# Patient Record
Sex: FEMALE | Race: OTHER | NOT HISPANIC OR LATINO | Employment: UNEMPLOYED | URBAN - METROPOLITAN AREA
[De-identification: names, ages, dates, MRNs, and addresses within clinical notes are randomized per-mention and may not be internally consistent; named-entity substitution may affect disease eponyms.]

---

## 2023-03-03 LAB
ALANINE AMINOTRANSFERASE (SGPT) (U/L) IN SER/PLAS: 17 U/L (ref 7–45)
ALBUMIN (G/DL) IN SER/PLAS: 4.3 G/DL (ref 3.4–5)
ALKALINE PHOSPHATASE (U/L) IN SER/PLAS: 57 U/L (ref 33–110)
ANION GAP IN SER/PLAS: 12 MMOL/L (ref 10–20)
ANTI-DNA (DS): <1 IU/ML
ASPARTATE AMINOTRANSFERASE (SGOT) (U/L) IN SER/PLAS: 19 U/L (ref 9–39)
BASOPHILS (10*3/UL) IN BLOOD BY AUTOMATED COUNT: 0.02 X10E9/L (ref 0–0.1)
BASOPHILS/100 LEUKOCYTES IN BLOOD BY AUTOMATED COUNT: 0.2 % (ref 0–2)
BILIRUBIN TOTAL (MG/DL) IN SER/PLAS: 0.5 MG/DL (ref 0–1.2)
CALCIUM (MG/DL) IN SER/PLAS: 8.9 MG/DL (ref 8.6–10.3)
CARBON DIOXIDE, TOTAL (MMOL/L) IN SER/PLAS: 27 MMOL/L (ref 21–32)
CHLORIDE (MMOL/L) IN SER/PLAS: 101 MMOL/L (ref 98–107)
COBALAMIN (VITAMIN B12) (PG/ML) IN SER/PLAS: 325 PG/ML (ref 211–911)
COMPLEMENT C3 (MG/DL) IN SER/PLAS: 138 MG/DL (ref 87–200)
COMPLEMENT C4 (MG/DL) IN SER/PLAS: 54 MG/DL (ref 10–50)
CREATININE (MG/DL) IN SER/PLAS: 0.57 MG/DL (ref 0.5–1.05)
CREATININE (MG/DL) IN URINE: 18.3 MG/DL (ref 20–320)
EOSINOPHILS (10*3/UL) IN BLOOD BY AUTOMATED COUNT: 0.14 X10E9/L (ref 0–0.7)
EOSINOPHILS/100 LEUKOCYTES IN BLOOD BY AUTOMATED COUNT: 1.7 % (ref 0–6)
ERYTHROCYTE DISTRIBUTION WIDTH (RATIO) BY AUTOMATED COUNT: 13.6 % (ref 11.5–14.5)
ERYTHROCYTE MEAN CORPUSCULAR HEMOGLOBIN CONCENTRATION (G/DL) BY AUTOMATED: 31.6 G/DL (ref 32–36)
ERYTHROCYTE MEAN CORPUSCULAR VOLUME (FL) BY AUTOMATED COUNT: 79 FL (ref 80–100)
ERYTHROCYTES (10*6/UL) IN BLOOD BY AUTOMATED COUNT: 5.37 X10E12/L (ref 4–5.2)
FOLATE (NG/ML) IN SER/PLAS: 18.2 NG/ML
GFR FEMALE: >90 ML/MIN/1.73M2
GLUCOSE (MG/DL) IN SER/PLAS: 79 MG/DL (ref 74–99)
HEMATOCRIT (%) IN BLOOD BY AUTOMATED COUNT: 42.4 % (ref 36–46)
HEMOGLOBIN (G/DL) IN BLOOD: 13.4 G/DL (ref 12–16)
IMMATURE GRANULOCYTES/100 LEUKOCYTES IN BLOOD BY AUTOMATED COUNT: 0.2 % (ref 0–0.9)
LEUKOCYTES (10*3/UL) IN BLOOD BY AUTOMATED COUNT: 8 X10E9/L (ref 4.4–11.3)
LYMPHOCYTES (10*3/UL) IN BLOOD BY AUTOMATED COUNT: 3.37 X10E9/L (ref 1.2–4.8)
LYMPHOCYTES/100 LEUKOCYTES IN BLOOD BY AUTOMATED COUNT: 41.9 % (ref 13–44)
MONOCYTES (10*3/UL) IN BLOOD BY AUTOMATED COUNT: 0.99 X10E9/L (ref 0.1–1)
MONOCYTES/100 LEUKOCYTES IN BLOOD BY AUTOMATED COUNT: 12.3 % (ref 2–10)
NEUTROPHILS (10*3/UL) IN BLOOD BY AUTOMATED COUNT: 3.5 X10E9/L (ref 1.2–7.7)
NEUTROPHILS/100 LEUKOCYTES IN BLOOD BY AUTOMATED COUNT: 43.7 % (ref 40–80)
PLATELETS (10*3/UL) IN BLOOD AUTOMATED COUNT: 285 X10E9/L (ref 150–450)
POTASSIUM (MMOL/L) IN SER/PLAS: 3.8 MMOL/L (ref 3.5–5.3)
PROTEIN (MG/DL) IN URINE: 4 MG/DL (ref 5–24)
PROTEIN TOTAL: 7.1 G/DL (ref 6.4–8.2)
PROTEIN/CREATININE (MG/MG) IN URINE: 0.22 MG/MG CREAT (ref 0–0.17)
SEDIMENTATION RATE, ERYTHROCYTE: 7 MM/H (ref 0–20)
SODIUM (MMOL/L) IN SER/PLAS: 136 MMOL/L (ref 136–145)
UREA NITROGEN (MG/DL) IN SER/PLAS: 10 MG/DL (ref 6–23)

## 2023-04-17 ENCOUNTER — OFFICE VISIT (OUTPATIENT)
Dept: PRIMARY CARE | Facility: CLINIC | Age: 28
End: 2023-04-17
Payer: COMMERCIAL

## 2023-04-17 ENCOUNTER — TELEPHONE (OUTPATIENT)
Dept: PRIMARY CARE | Facility: CLINIC | Age: 28
End: 2023-04-17

## 2023-04-17 VITALS
WEIGHT: 170.38 LBS | HEART RATE: 75 BPM | OXYGEN SATURATION: 96 % | SYSTOLIC BLOOD PRESSURE: 113 MMHG | DIASTOLIC BLOOD PRESSURE: 79 MMHG | RESPIRATION RATE: 16 BRPM | BODY MASS INDEX: 33.27 KG/M2 | TEMPERATURE: 98 F

## 2023-04-17 DIAGNOSIS — Z00.00 HEALTHCARE MAINTENANCE: ICD-10-CM

## 2023-04-17 DIAGNOSIS — F32.1 CURRENT MODERATE EPISODE OF MAJOR DEPRESSIVE DISORDER, UNSPECIFIED WHETHER RECURRENT (MULTI): Primary | ICD-10-CM

## 2023-04-17 DIAGNOSIS — M32.9 SYSTEMIC LUPUS ERYTHEMATOSUS, UNSPECIFIED SLE TYPE, UNSPECIFIED ORGAN INVOLVEMENT STATUS (MULTI): ICD-10-CM

## 2023-04-17 PROBLEM — M79.7 FIBROMYALGIA: Status: ACTIVE | Noted: 2023-04-17

## 2023-04-17 PROBLEM — M25.50 ARTHRALGIA OF MULTIPLE SITES: Status: ACTIVE | Noted: 2023-04-17

## 2023-04-17 PROBLEM — F32.A DEPRESSION: Status: ACTIVE | Noted: 2023-04-17

## 2023-04-17 PROBLEM — L23.81: Status: ACTIVE | Noted: 2023-04-17

## 2023-04-17 PROBLEM — H50.15 ALTERNATING EXOTROPIA: Status: ACTIVE | Noted: 2023-04-17

## 2023-04-17 PROCEDURE — 1036F TOBACCO NON-USER: CPT | Performed by: STUDENT IN AN ORGANIZED HEALTH CARE EDUCATION/TRAINING PROGRAM

## 2023-04-17 PROCEDURE — 99395 PREV VISIT EST AGE 18-39: CPT | Performed by: STUDENT IN AN ORGANIZED HEALTH CARE EDUCATION/TRAINING PROGRAM

## 2023-04-17 RX ORDER — BUPROPION HYDROCHLORIDE 150 MG/1
150 TABLET ORAL DAILY
COMMUNITY
Start: 2022-08-01 | End: 2023-04-17 | Stop reason: SDUPTHER

## 2023-04-17 RX ORDER — BUPROPION HYDROCHLORIDE 150 MG/1
150 TABLET ORAL DAILY
Qty: 90 TABLET | Refills: 3 | Status: SHIPPED | OUTPATIENT
Start: 2023-04-17 | End: 2023-10-23 | Stop reason: SDUPTHER

## 2023-04-17 RX ORDER — BELIMUMAB 200 MG/ML
200 SOLUTION SUBCUTANEOUS
COMMUNITY
Start: 2018-07-17 | End: 2023-10-25 | Stop reason: SDUPTHER

## 2023-04-17 RX ORDER — ALBUTEROL SULFATE 90 UG/1
2 AEROSOL, METERED RESPIRATORY (INHALATION) EVERY 4 HOURS PRN
COMMUNITY
Start: 2022-07-15 | End: 2023-10-23 | Stop reason: SDUPTHER

## 2023-04-17 RX ORDER — VIT C/E/ZN/COPPR/LUTEIN/ZEAXAN 250MG-90MG
25 CAPSULE ORAL DAILY
COMMUNITY

## 2023-04-17 SDOH — ECONOMIC STABILITY: FOOD INSECURITY: WITHIN THE PAST 12 MONTHS, THE FOOD YOU BOUGHT JUST DIDN'T LAST AND YOU DIDN'T HAVE MONEY TO GET MORE.: NEVER TRUE

## 2023-04-17 SDOH — ECONOMIC STABILITY: FOOD INSECURITY: WITHIN THE PAST 12 MONTHS, YOU WORRIED THAT YOUR FOOD WOULD RUN OUT BEFORE YOU GOT MONEY TO BUY MORE.: NEVER TRUE

## 2023-04-17 SDOH — ECONOMIC STABILITY: HOUSING INSECURITY
IN THE LAST 12 MONTHS, WAS THERE A TIME WHEN YOU DID NOT HAVE A STEADY PLACE TO SLEEP OR SLEPT IN A SHELTER (INCLUDING NOW)?: YES

## 2023-04-17 SDOH — HEALTH STABILITY: PHYSICAL HEALTH: ON AVERAGE, HOW MANY MINUTES DO YOU ENGAGE IN EXERCISE AT THIS LEVEL?: 120 MIN

## 2023-04-17 SDOH — ECONOMIC STABILITY: TRANSPORTATION INSECURITY
IN THE PAST 12 MONTHS, HAS LACK OF TRANSPORTATION KEPT YOU FROM MEETINGS, WORK, OR FROM GETTING THINGS NEEDED FOR DAILY LIVING?: NO

## 2023-04-17 SDOH — ECONOMIC STABILITY: TRANSPORTATION INSECURITY
IN THE PAST 12 MONTHS, HAS THE LACK OF TRANSPORTATION KEPT YOU FROM MEDICAL APPOINTMENTS OR FROM GETTING MEDICATIONS?: NO

## 2023-04-17 SDOH — ECONOMIC STABILITY: INCOME INSECURITY: IN THE LAST 12 MONTHS, WAS THERE A TIME WHEN YOU WERE NOT ABLE TO PAY THE MORTGAGE OR RENT ON TIME?: YES

## 2023-04-17 SDOH — HEALTH STABILITY: PHYSICAL HEALTH: ON AVERAGE, HOW MANY DAYS PER WEEK DO YOU ENGAGE IN MODERATE TO STRENUOUS EXERCISE (LIKE A BRISK WALK)?: 6 DAYS

## 2023-04-17 ASSESSMENT — ENCOUNTER SYMPTOMS
NAUSEA: 0
PALPITATIONS: 0
VOMITING: 0
LIGHT-HEADEDNESS: 0
SHORTNESS OF BREATH: 0
LOSS OF SENSATION IN FEET: 0
CONSTIPATION: 0
FEVER: 0
FLANK PAIN: 0
FATIGUE: 0
BLOOD IN STOOL: 0
DEPRESSION: 0
ARTHRALGIAS: 0
ABDOMINAL PAIN: 0
RHINORRHEA: 0
HEMATURIA: 0
MYALGIAS: 0
DIZZINESS: 0
DIARRHEA: 0
SINUS PAIN: 0
APPETITE CHANGE: 0
OCCASIONAL FEELINGS OF UNSTEADINESS: 0

## 2023-04-17 ASSESSMENT — PATIENT HEALTH QUESTIONNAIRE - PHQ9
6. FEELING BAD ABOUT YOURSELF - OR THAT YOU ARE A FAILURE OR HAVE LET YOURSELF OR YOUR FAMILY DOWN: SEVERAL DAYS
SUM OF ALL RESPONSES TO PHQ QUESTIONS 1-9: 7
5. POOR APPETITE OR OVEREATING: NOT AT ALL
SUM OF ALL RESPONSES TO PHQ9 QUESTIONS 1 & 2: 2
4. FEELING TIRED OR HAVING LITTLE ENERGY: SEVERAL DAYS
2. FEELING DOWN, DEPRESSED OR HOPELESS: SEVERAL DAYS
8. MOVING OR SPEAKING SO SLOWLY THAT OTHER PEOPLE COULD HAVE NOTICED. OR THE OPPOSITE, BEING SO FIGETY OR RESTLESS THAT YOU HAVE BEEN MOVING AROUND A LOT MORE THAN USUAL: SEVERAL DAYS
7. TROUBLE CONCENTRATING ON THINGS, SUCH AS READING THE NEWSPAPER OR WATCHING TELEVISION: NOT AT ALL
9. THOUGHTS THAT YOU WOULD BE BETTER OFF DEAD, OR OF HURTING YOURSELF: SEVERAL DAYS
3. TROUBLE FALLING OR STAYING ASLEEP: SEVERAL DAYS
10. IF YOU CHECKED OFF ANY PROBLEMS, HOW DIFFICULT HAVE THESE PROBLEMS MADE IT FOR YOU TO DO YOUR WORK, TAKE CARE OF THINGS AT HOME, OR GET ALONG WITH OTHER PEOPLE: SOMEWHAT DIFFICULT
1. LITTLE INTEREST OR PLEASURE IN DOING THINGS: SEVERAL DAYS

## 2023-04-17 ASSESSMENT — LIFESTYLE VARIABLES
HOW OFTEN DO YOU HAVE SIX OR MORE DRINKS ON ONE OCCASION: NEVER
HOW OFTEN DO YOU HAVE A DRINK CONTAINING ALCOHOL: NEVER
HOW MANY STANDARD DRINKS CONTAINING ALCOHOL DO YOU HAVE ON A TYPICAL DAY: PATIENT DOES NOT DRINK
AUDIT-C TOTAL SCORE: 0
SKIP TO QUESTIONS 9-10: 1

## 2023-04-17 ASSESSMENT — SOCIAL DETERMINANTS OF HEALTH (SDOH)
DO YOU BELONG TO ANY CLUBS OR ORGANIZATIONS SUCH AS CHURCH GROUPS UNIONS, FRATERNAL OR ATHLETIC GROUPS, OR SCHOOL GROUPS?: YES
HOW OFTEN DO YOU ATTENT MEETINGS OF THE CLUB OR ORGANIZATION YOU BELONG TO?: MORE THAN 4 TIMES PER YEAR
HOW OFTEN DO YOU GET TOGETHER WITH FRIENDS OR RELATIVES?: ONCE A WEEK
WITHIN THE LAST YEAR, HAVE TO BEEN RAPED OR FORCED TO HAVE ANY KIND OF SEXUAL ACTIVITY BY YOUR PARTNER OR EX-PARTNER?: NO
WITHIN THE LAST YEAR, HAVE YOU BEEN KICKED, HIT, SLAPPED, OR OTHERWISE PHYSICALLY HURT BY YOUR PARTNER OR EX-PARTNER?: NO
HOW HARD IS IT FOR YOU TO PAY FOR THE VERY BASICS LIKE FOOD, HOUSING, MEDICAL CARE, AND HEATING?: VERY HARD
WITHIN THE LAST YEAR, HAVE YOU BEEN AFRAID OF YOUR PARTNER OR EX-PARTNER?: NO
IN A TYPICAL WEEK, HOW MANY TIMES DO YOU TALK ON THE PHONE WITH FAMILY, FRIENDS, OR NEIGHBORS?: THREE TIMES A WEEK
HOW OFTEN DO YOU ATTEND CHURCH OR RELIGIOUS SERVICES?: MORE THAN 4 TIMES PER YEAR
WITHIN THE LAST YEAR, HAVE YOU BEEN HUMILIATED OR EMOTIONALLY ABUSED IN OTHER WAYS BY YOUR PARTNER OR EX-PARTNER?: NO

## 2023-04-17 NOTE — PROGRESS NOTES
Subjective   Leah Scanlon is a 28 y.o. female who is here for a routine exam.  Active Problem List      Comprehensive Medical/Surgical/Social/Family History  Past Medical History:   Diagnosis Date    Anesthesia of skin 10/16/2017    Numbness in feet    Encounter for contraceptive management, unspecified 09/10/2020    Contraception management    Encounter for gynecological examination (general) (routine) without abnormal findings 12/17/2021    Well woman exam    Gastro-esophageal reflux disease without esophagitis 02/04/2022    GERD without esophagitis    Migraine, unspecified, not intractable, without status migrainosus 10/16/2017    Migraine    Other specified cough 08/12/2019    Productive cough    Other specified symptoms and signs involving the digestive system and abdomen 06/10/2020    Irregular bowel habits    Personal history of diseases of the skin and subcutaneous tissue 09/10/2020    History of acne    Personal history of other diseases of the digestive system 06/10/2020    History of rectal bleeding    Personal history of other diseases of the digestive system 06/10/2020    History of gastroesophageal reflux (GERD)    Personal history of other diseases of the digestive system 04/27/2018    History of dental abscess    Personal history of other diseases of the nervous system and sense organs     History of itching of eye    Personal history of other mental and behavioral disorders 10/16/2017    History of depression    Personal history of other specified conditions 08/01/2022    History of urinary incontinence    Systemic involvement of connective tissue, unspecified (CMS/HCC) 06/06/2018    Undifferentiated connective tissue disease     Past Surgical History:   Procedure Laterality Date    EYE SURGERY  10/16/2017    Eye Surgery    FOOT SURGERY  10/16/2017    Foot Surgery    TONSILLECTOMY  10/16/2017    Tonsillectomy With Adenoidectomy     Social History     Social History Narrative    Not on file          Allergies and Medications  Dog dander, Bee pollen, Cat dander, Other, and Ragweed  Current Outpatient Medications on File Prior to Visit   Medication Sig Dispense Refill    albuterol 90 mcg/actuation inhaler Inhale 2 puffs every 4 hours if needed.      Benlysta 200 mg/mL injection Inject 1 mL (200 mg) under the skin 1 (one) time per week.      CALCIUM ORAL Take 50 mg by mouth once daily.      cholecalciferol (Vitamin D-3) 25 MCG (1000 UT) capsule Take 1 capsule (25 mcg) by mouth once daily.      MAGNESIUM CARBONATE ORAL Take by mouth once daily.      multivitamin capsule Take 1 capsule by mouth once daily.      RIBOFLAVIN, VITAMIN B2, ORAL Take by mouth once daily.      [DISCONTINUED] buPROPion XL (Wellbutrin XL) 150 mg 24 hr tablet Take 1 tablet (150 mg) by mouth once daily.       No current facility-administered medications on file prior to visit.     Improving knee pain with informal PT    Had an episode of SOB with exertion chasing a dog while watching.    Stopped using cane, improving pain.    Restarted benlysta, significant improvement with this.          Lifestyle  Diet: Improving diet, once monthly eating out  Exercise: PT, improving exercises  Tobacco: None  Alcohol:  None  Stress/Work:  , doing well with this.  THC gummies with SLE flare ups    Pap Smear: 4 years ago  Periods are irregular, lasting 5 days.   Dysmenorrhea:none.   Cyclic symptoms include none.   No intermenstrual bleeding, spotting, or discharge.    Current contraception: none  History of abnormal Pap smear: no  Family history of breast cancer: no    Menstrual History:  OB History    No obstetric history on file.        No LMP recorded.     The patient is due for a Pap smear, and to see OB/GYN is requesting new OB/GYN referral.  No other concerns.  Improving overall health as above.    Review of Systems   Constitutional:  Negative for appetite change, fatigue and fever.   HENT:  Negative for ear discharge, ear pain,  hearing loss, postnasal drip, rhinorrhea and sinus pain.    Eyes:  Negative for visual disturbance.   Respiratory:  Negative for shortness of breath.    Cardiovascular:  Negative for chest pain, palpitations and leg swelling.   Gastrointestinal:  Negative for abdominal pain, blood in stool, constipation, diarrhea, nausea and vomiting.   Genitourinary:  Negative for flank pain and hematuria.   Musculoskeletal:  Negative for arthralgias and myalgias.   Skin:  Negative for rash.   Neurological:  Negative for dizziness and light-headedness.   All other systems reviewed and are negative.      Objective   /79 (BP Location: Right arm, Patient Position: Sitting)   Pulse 75   Temp 36.7 °C (98 °F) (Temporal)   Resp 16   Wt 77.3 kg (170 lb 6 oz)   SpO2 96%   BMI 33.27 kg/m²     Physical Exam  Vitals reviewed.   Constitutional:       General: She is not in acute distress.     Appearance: Normal appearance. She is normal weight. She is not toxic-appearing.   HENT:      Head: Normocephalic and atraumatic.      Right Ear: Tympanic membrane and ear canal normal.      Left Ear: Tympanic membrane and ear canal normal.      Nose: Nose normal. No congestion or rhinorrhea.      Mouth/Throat:      Mouth: Mucous membranes are dry.   Eyes:      General: No scleral icterus.     Extraocular Movements: Extraocular movements intact.      Conjunctiva/sclera: Conjunctivae normal.      Pupils: Pupils are equal, round, and reactive to light.   Cardiovascular:      Rate and Rhythm: Normal rate and regular rhythm.      Heart sounds: No murmur heard.     No friction rub. No gallop.   Pulmonary:      Effort: Pulmonary effort is normal. No respiratory distress.      Breath sounds: Normal breath sounds. No wheezing, rhonchi or rales.   Abdominal:      General: Abdomen is flat. There is no distension.      Palpations: Abdomen is soft.      Tenderness: There is no abdominal tenderness. There is no guarding.   Musculoskeletal:         General:  Normal range of motion.      Cervical back: Normal range of motion and neck supple.      Right lower leg: No edema.      Left lower leg: No edema.   Lymphadenopathy:      Cervical: No cervical adenopathy.   Skin:     General: Skin is warm and dry.   Neurological:      General: No focal deficit present.      Mental Status: She is alert and oriented to person, place, and time.   Psychiatric:         Mood and Affect: Mood normal.         Behavior: Behavior normal.         Assessment/Plan   Problem List Items Addressed This Visit          Other    Depression - Primary    Relevant Medications    buPROPion XL (Wellbutrin XL) 150 mg 24 hr tablet     Other Visit Diagnoses       Healthcare maintenance                Reviewed Social Determinants of health with patient, discussed healthy lifestyle including 150 minutes of physical activity per week  Ordered/Reviewed baseline labwork -CBC, CMP, Lipid Panel  Reviewed from rheumatology largely unremarkable  Immunizations Up-to-Date  Pap smear due, will follow with OB/GYN  Mammogram not indicated    We will refill her Wellbutrin overall doing well.

## 2023-04-17 NOTE — TELEPHONE ENCOUNTER
Pt forgot to ask in her appointment this morning for a medication to be sent into the pharmacy. She said it is Diflucan, what she usually gets prescribed for a yeast infection. She said she is getting  soon.     [facility], [place], [place] and [place]

## 2023-10-16 ENCOUNTER — APPOINTMENT (OUTPATIENT)
Dept: PRIMARY CARE | Facility: CLINIC | Age: 28
End: 2023-10-16
Payer: COMMERCIAL

## 2023-10-23 ENCOUNTER — OFFICE VISIT (OUTPATIENT)
Dept: PRIMARY CARE | Facility: CLINIC | Age: 28
End: 2023-10-23
Payer: COMMERCIAL

## 2023-10-23 VITALS
HEART RATE: 92 BPM | BODY MASS INDEX: 33.23 KG/M2 | OXYGEN SATURATION: 98 % | TEMPERATURE: 98.2 F | SYSTOLIC BLOOD PRESSURE: 128 MMHG | WEIGHT: 170.13 LBS | RESPIRATION RATE: 16 BRPM | DIASTOLIC BLOOD PRESSURE: 86 MMHG

## 2023-10-23 DIAGNOSIS — R00.0 TACHYCARDIA: ICD-10-CM

## 2023-10-23 DIAGNOSIS — R32 URINARY INCONTINENCE, UNSPECIFIED TYPE: ICD-10-CM

## 2023-10-23 DIAGNOSIS — M32.9 SYSTEMIC LUPUS ERYTHEMATOSUS, UNSPECIFIED SLE TYPE, UNSPECIFIED ORGAN INVOLVEMENT STATUS (MULTI): ICD-10-CM

## 2023-10-23 DIAGNOSIS — F90.9 ATTENTION DEFICIT HYPERACTIVITY DISORDER (ADHD), UNSPECIFIED ADHD TYPE: Primary | ICD-10-CM

## 2023-10-23 DIAGNOSIS — F32.1 CURRENT MODERATE EPISODE OF MAJOR DEPRESSIVE DISORDER, UNSPECIFIED WHETHER RECURRENT (MULTI): ICD-10-CM

## 2023-10-23 PROCEDURE — 99214 OFFICE O/P EST MOD 30 MIN: CPT | Performed by: STUDENT IN AN ORGANIZED HEALTH CARE EDUCATION/TRAINING PROGRAM

## 2023-10-23 PROCEDURE — 1036F TOBACCO NON-USER: CPT | Performed by: STUDENT IN AN ORGANIZED HEALTH CARE EDUCATION/TRAINING PROGRAM

## 2023-10-23 RX ORDER — ALBUTEROL SULFATE 90 UG/1
2 AEROSOL, METERED RESPIRATORY (INHALATION) EVERY 4 HOURS PRN
Qty: 18 G | Refills: 11 | Status: SHIPPED | OUTPATIENT
Start: 2023-10-23 | End: 2024-04-07 | Stop reason: SDUPTHER

## 2023-10-23 RX ORDER — BUPROPION HYDROCHLORIDE 150 MG/1
150 TABLET ORAL DAILY
Qty: 90 TABLET | Refills: 3 | Status: SHIPPED | OUTPATIENT
Start: 2023-10-23 | End: 2023-11-27 | Stop reason: SDUPTHER

## 2023-10-23 ASSESSMENT — ENCOUNTER SYMPTOMS
VOMITING: 0
FEVER: 0
DIZZINESS: 0
SHORTNESS OF BREATH: 0
NAUSEA: 0
LIGHT-HEADEDNESS: 0

## 2023-10-23 NOTE — PROGRESS NOTES
Subjective   Bea Scanlon is a 28 y.o. female who presents for Med Refill, Referral, and Labs Only (Pt here today to request refills, referral, and orders for lab work).  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  Overall improvement with current visibility and activities of daily living.          Review of Systems   Constitutional:  Negative for fever.   Respiratory:  Negative for shortness of breath.    Cardiovascular:  Negative for chest pain.   Gastrointestinal:  Negative for nausea and vomiting.   Neurological:  Negative for dizziness and light-headedness.   All other systems reviewed and are negative.      Objective   Physical Exam  Vitals reviewed.   Constitutional:       General: She is not in acute distress.     Appearance: Normal appearance. She is not toxic-appearing.   HENT:      Head: Normocephalic and atraumatic.      Nose: Nose normal.   Eyes:      Extraocular Movements: Extraocular movements intact.   Cardiovascular:      Rate and Rhythm: Normal rate and regular rhythm.      Heart sounds: No murmur heard.     No friction rub. No gallop.   Pulmonary:      Effort: Pulmonary effort is normal. No respiratory distress.      Breath sounds: Normal breath sounds. No wheezing, rhonchi or rales.   Skin:     General: Skin is warm and dry.   Neurological:      General: No focal deficit present.      Mental Status: She is alert.   Psychiatric:         Mood and Affect: Mood normal.         Behavior: Behavior normal.         Assessment/Plan   Problem List Items Addressed This Visit       Attention deficit hyperactivity disorder (ADHD) - Primary    Depression    Relevant Medications    buPROPion XL  (Wellbutrin XL) 150 mg 24 hr tablet    SLE (systemic lupus erythematosus) (CMS/[facility])    Relevant Medications    albuterol 90 mcg/actuation inhaler    Other Relevant Orders    Comprehensive Metabolic Panel    CBC     Other Visit Diagnoses       Tachycardia        Relevant Orders    Holter or Event Cardiac Monitor    Magnesium    Referral to Cardiology    Urinary incontinence, unspecified type        Relevant Orders    Referral to Urogynecology        Patient seen today for 6-month follow-up.    Overall doing very well, increasing activities, working.  She has been improving her diet as well.  She has states she has had less binge eating and stress related eating.    Discussed tachycardia could be related to stress, we will get a Holter monitor and refer to cardiology for patient's request.    We will do routine lab work as well to monitor for causes of tachycardia and magnesium.    We will refer to urogynecology for symptoms of incontinence as well.    Follow-up in 6 months or sooner if new concerns arise.

## 2023-10-24 PROBLEM — R35.0 URINARY FREQUENCY: Status: ACTIVE | Noted: 2023-10-24

## 2023-10-24 PROBLEM — G62.9 PERIPHERAL NEUROPATHY: Status: ACTIVE | Noted: 2023-10-24

## 2023-10-24 RX ORDER — FLUORIDE (SODIUM) 1.1 %
PASTE (ML) DENTAL
COMMUNITY
Start: 2023-04-19

## 2023-10-24 RX ORDER — HYDROXYCHLOROQUINE SULFATE 200 MG/1
1 TABLET, FILM COATED ORAL DAILY
COMMUNITY
Start: 2023-07-27 | End: 2023-10-25 | Stop reason: SDUPTHER

## 2023-10-24 RX ORDER — ONDANSETRON 4 MG/1
4 TABLET, ORALLY DISINTEGRATING ORAL EVERY 6 HOURS PRN
COMMUNITY
Start: 2022-11-12 | End: 2023-10-25 | Stop reason: ALTCHOICE

## 2023-10-24 RX ORDER — DOXYCYCLINE 100 MG/1
CAPSULE ORAL
COMMUNITY
Start: 2023-08-17 | End: 2023-10-25 | Stop reason: ALTCHOICE

## 2023-10-24 RX ORDER — FLUCONAZOLE 150 MG/1
TABLET ORAL
COMMUNITY
Start: 2017-01-17 | End: 2023-10-25 | Stop reason: ALTCHOICE

## 2023-10-25 ENCOUNTER — OFFICE VISIT (OUTPATIENT)
Dept: RHEUMATOLOGY | Facility: CLINIC | Age: 28
End: 2023-10-25
Payer: COMMERCIAL

## 2023-10-25 VITALS
DIASTOLIC BLOOD PRESSURE: 70 MMHG | OXYGEN SATURATION: 98 % | SYSTOLIC BLOOD PRESSURE: 122 MMHG | HEART RATE: 91 BPM | HEIGHT: 60 IN | WEIGHT: 166 LBS | TEMPERATURE: 97.6 F | BODY MASS INDEX: 32.59 KG/M2

## 2023-10-25 DIAGNOSIS — G89.29 CHRONIC PAIN OF LEFT KNEE: ICD-10-CM

## 2023-10-25 DIAGNOSIS — M32.9 SYSTEMIC LUPUS ERYTHEMATOSUS, UNSPECIFIED SLE TYPE, UNSPECIFIED ORGAN INVOLVEMENT STATUS (MULTI): Primary | ICD-10-CM

## 2023-10-25 DIAGNOSIS — M25.562 CHRONIC PAIN OF LEFT KNEE: ICD-10-CM

## 2023-10-25 PROCEDURE — 1036F TOBACCO NON-USER: CPT | Performed by: INTERNAL MEDICINE

## 2023-10-25 PROCEDURE — 99214 OFFICE O/P EST MOD 30 MIN: CPT | Performed by: INTERNAL MEDICINE

## 2023-10-25 RX ORDER — BELIMUMAB 200 MG/ML
200 SOLUTION SUBCUTANEOUS
Qty: 12 ML | Refills: 3 | Status: SHIPPED | OUTPATIENT
Start: 2023-10-25 | End: 2023-10-27 | Stop reason: SDUPTHER

## 2023-10-25 RX ORDER — HYDROXYCHLOROQUINE SULFATE 200 MG/1
200 TABLET, FILM COATED ORAL DAILY
Qty: 30 TABLET | Refills: 5 | Status: SHIPPED | OUTPATIENT
Start: 2023-10-25 | End: 2024-02-19 | Stop reason: SDUPTHER

## 2023-10-25 ASSESSMENT — PATIENT HEALTH QUESTIONNAIRE - PHQ9
1. LITTLE INTEREST OR PLEASURE IN DOING THINGS: NOT AT ALL
SUM OF ALL RESPONSES TO PHQ9 QUESTIONS 1 AND 2: 0
2. FEELING DOWN, DEPRESSED OR HOPELESS: NOT AT ALL

## 2023-10-25 NOTE — PROGRESS NOTES
"Subjective   Patient ID: Bea Scanlon is a 28 y.o. female who presents for follow up re: SLE.    HPI 27 yo F here for f/u re: SLE. She remains on Benlysta 200 mg SC weekly and hydroxychloroquine 200 mg daily.     She was off her medication for several months in 2022, due to insurance issues.  She was able to resume hydroxychloroquine 200 mg daily and Benlysta 200 mg SQ weekly 2 months ago.    She is not having much joint pain except left knee pain.  She sometimes feels as though the left knee will buckle.  She wears braces on both knees.    She has had episodes of tachycardia which seem to be related to anxiety and stressful situations.  1 occurred December 2022 when she was chasing a dog that she was dog watching.  Another episode occurred a few weeks ago October 2023, when she needed to walk her dog around the block.  Her heart rate will get up to 185 to 195.  This is sometimes associated with sharp chest pains.  She had normal echo 6/20.  She has appointment to see Dr. Escobar in cardiology 11/23.    She states she has started walking more and has lost some weight again.  Current BMI is 32.    She still has mild dry eyes for which she uses over-the-counter artificial tears.  She had an eye exam last year at \"[facility]".  She knows that she needs to do hydroxychloroquine eye exam this year-she did not have the money last year.  Her dentist gave her a high fluoride toothpaste to use for dry mouth.  She is also using a Waterpik and alcohol free mouthwash.    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    She still has Raynaud's but no digital ulcers.     She still c/o urinary frequency.  She does drink 12-16 ounces of water 3 times daily.  She states that she urinates about 3-4 times every hour.  When she lays down to go to bed at night, she gets up 3 times in the first hour to urinate.  After that, she is able to sleep 12 hours without having to get " up and urinate.  Urinalysis done August 1, 2022 was normal (no evidence of infection).  She has upcoming appointment scheduled in urogynecology.    Her other new complaint is episodic numbness and tingling in her feet. This has been happening off and on for 2 months.    Last eye exam was December 2020.  She had an eye exam in 2022, but was not an eye exam for hydroxychloroquine.  She is currently been on hydroxychloroquine since 2018 (approximately 4 years).  I explained that after 5 years she needs yearly eye exams for hydroxychloroquine.    She did get  January 2022.    She was previously on antidepressants since sixth grade.  She states that they never helped.  She currently still works with a psychologist.    She had Pfizer COVID-19 vaccine May 2021, June 2021, with booster January 4, 2022.    She had flu shot September 30, 2021.    She has stopped taking Vyvanse. She has lessened caffeine intake.    Last eye exam 12/20 (includes OCT and visual field). .    Labs 10/17: DILLON 1:320 with negative panel, rheumatoid factor negative, citrulline antibody negative, CBC normal, CMP normal, CRP negative, ESR 8.  Labs January 2021: CBC normal, BMP normal except glucose 105 and sodium 135, spot urine protein to creatinine ratio 0  Labs October 2021: CBC normal except MCV 78, CMP normal except sodium 135, cholesterol 135, HDL 62, LDL 55, triglycerides 88, spot urine protein to creatinine ratio 0  Labs April 2022: ESR 8, CBC normal except MCV 79, CMP normal, TSH 2.65, double-stranded DNA negative, C3 and C4 normal, spot urine protein to creatinine ratio 0  Lab 8/22: UA normal  Labs 3/23: CMP normal, CBC normal, ESR 7, vitamin B12 and folate normal, double-stranded DNA negative, C3 and C4 normal, spot urine protein to creatinine ratio 0.22    Echo 6/20: normal.    Social history: . does not smoke. Uses alcohol occasionally socially.     Medical problem list:    - SLE- DILLON 1:320 (negative panel), arthritis   -  "Fibromyalgia-    - History of migraine headaches        ROS:  General: Denies fevers or chills.  CV: Denies chest pain or palpitations.  Denies leg edema.  Lungs: Denies coughing or shortness of breath.  Skin: Denies rashes or nodules.  MS: Denies joint pain or joint swelling.     Objective   Visit Vitals  /70 (BP Location: Left arm, Patient Position: Sitting, BP Cuff Size: Adult)   Pulse 91   Temp 36.4 °C (97.6 °F) (Temporal)   Ht 1.524 m (5')   Wt 75.3 kg (166 lb)   SpO2 98%   BMI 32.42 kg/m²   Smoking Status Never   BSA 1.79 m²        Physical Exam  HEENT: PERRL, EOMI  Neck: Supple, no nodes.  CV: RRR, no MGR.  Lungs: Clear, no rales or wheezes.  Abdomen: Soft, nontender. No hepatosplenomegaly.  Extremities:  No cyanosis, clubbing, or edema.  MS: No synovitis.  Skin: No rashes or nodules.      Assessment/Plan   Problem List Items Addressed This Visit             ICD-10-CM    SLE (systemic lupus erythematosus) (CMS/[facility]) - Primary M32.9      1. SLE-doing better since she resumed her medication early 2023.    2. Left costochondritis (recurrent)- has difficulty tolerating NSAIDs due to GERD. Chest pain not currently present.    3. Abnormal EKG- inverted T waves in leads 3, aVF, V3 and V4. Echo 6/20 unremarkable. Has appointment scheduled with Dr. Escobar in cardiology 11/23 due to recurrent tachycardia.    4. h/o frequent GERD which is occurring despite omeprazole 20 mg daily. Saw Dr. Gupta 7/20. EGD and colonoscopy were unremarkable. Bravo pH monitor was normal. Dr. Gupta did not find a correlation between acid reflux and either chest pain or cough.    5. Fatigue- ? Whether there is element of depression involved. However, she states she was previously on antidepressants for many years and never seemed to help. She still works with a psychologist.   She states that she is depressed because she is \"not like a normal 27-year-old\".    6. BMI 32-currently working on weight loss. She states that she has lost some " weight.    7. Urinary frequency-urinalysis August 1, 2022 was normal. Appointment is scheduled with urogynecology.    Plan:  Check CBC with diff, CMP, spot urine protein to creatinine ratio.  Follow-up in 4 months.

## 2023-10-26 ENCOUNTER — HOSPITAL ENCOUNTER (OUTPATIENT)
Dept: CARDIOLOGY | Facility: CLINIC | Age: 28
Discharge: HOME | End: 2023-10-26
Payer: COMMERCIAL

## 2023-10-26 DIAGNOSIS — R00.0 TACHYCARDIA: ICD-10-CM

## 2023-10-26 PROCEDURE — 93242 EXT ECG>48HR<7D RECORDING: CPT

## 2023-10-27 ENCOUNTER — SPECIALTY PHARMACY (OUTPATIENT)
Dept: PHARMACY | Facility: CLINIC | Age: 28
End: 2023-10-27

## 2023-10-27 DIAGNOSIS — M32.9 SYSTEMIC LUPUS ERYTHEMATOSUS, UNSPECIFIED SLE TYPE, UNSPECIFIED ORGAN INVOLVEMENT STATUS (MULTI): ICD-10-CM

## 2023-10-27 RX ORDER — BELIMUMAB 200 MG/ML
200 SOLUTION SUBCUTANEOUS
Qty: 12 ML | Refills: 3 | Status: SHIPPED | OUTPATIENT
Start: 2023-10-27 | End: 2024-02-19 | Stop reason: SDUPTHER

## 2023-10-27 NOTE — PROGRESS NOTES
Shakila GONZALES approved, per insurance pt must fill with [facility], rerouting Nicklysta rx to [facility]

## 2023-11-10 ENCOUNTER — OFFICE VISIT (OUTPATIENT)
Dept: CARDIOLOGY | Facility: CLINIC | Age: 28
End: 2023-11-10
Payer: COMMERCIAL

## 2023-11-10 VITALS
WEIGHT: 165 LBS | OXYGEN SATURATION: 98 % | SYSTOLIC BLOOD PRESSURE: 124 MMHG | BODY MASS INDEX: 32.39 KG/M2 | DIASTOLIC BLOOD PRESSURE: 84 MMHG | HEIGHT: 60 IN | HEART RATE: 95 BPM

## 2023-11-10 DIAGNOSIS — R07.9 CHEST PAIN, UNSPECIFIED TYPE: Primary | ICD-10-CM

## 2023-11-10 DIAGNOSIS — R00.0 TACHYCARDIA: ICD-10-CM

## 2023-11-10 PROCEDURE — 93010 ELECTROCARDIOGRAM REPORT: CPT | Performed by: INTERNAL MEDICINE

## 2023-11-10 PROCEDURE — 1036F TOBACCO NON-USER: CPT | Performed by: INTERNAL MEDICINE

## 2023-11-10 PROCEDURE — 93005 ELECTROCARDIOGRAM TRACING: CPT | Performed by: INTERNAL MEDICINE

## 2023-11-10 PROCEDURE — 99214 OFFICE O/P EST MOD 30 MIN: CPT | Performed by: INTERNAL MEDICINE

## 2023-11-10 PROCEDURE — 99204 OFFICE O/P NEW MOD 45 MIN: CPT | Performed by: INTERNAL MEDICINE

## 2023-11-10 ASSESSMENT — ENCOUNTER SYMPTOMS
OCCASIONAL FEELINGS OF UNSTEADINESS: 1
DEPRESSION: 1

## 2023-11-10 ASSESSMENT — PAIN SCALES - GENERAL: PAINLEVEL: 0-NO PAIN

## 2023-11-10 NOTE — PROGRESS NOTES
Name : Leah Scanlon    : 1995   MRN : [id number]   ENC Date : 11/10/23     Reason for visit:  chest pain and tachycardia    Assessment and Plan:  Chest pain: Patient symptoms sound more like esophageal spasm than it does coronary artery disease.  Even with a history of lupus the likelihood of early onset coronary disease is quite low.  I do not think stress testing is warranted.  It is more likely she could have had some type of pulmonary thromboembolic disease.  Certainly of course with lupus one needs to worry about pericardial disease and also potentially cardiomyopathy.  I think the best test is a simple echocardiogram.  This would help us evaluate and potentially rule out all of the above issues.  Tachycardia: Patient Holter monitor showed only sinus tachycardia.  Some of her heart rates however are quite excessive and could certainly be consistent with supraventricular tachycardia.  However her overall arrhythmia burden is actually fairly low.  At this point I would not do any further testing and simply take a watch and wait approach.  I explained this to the patient in detail.  Health maintenance: Patient has been on a high-protein diet and admits to poor dietary habits.  We will get a   Lipid panel.  Disp:  Phone follow-up with echocardiogram and lipid panel results      HPI:   patient is seen today for evaluation of chest discomfort and tachycardia.    Patient has a history of lupus on therapy for probably the last 6 years or so.   Patient had a couple of episodes of significant chest discomfort over the last several years.  She describes a sharp stabbing almost crushing like pain in her upper chest that takes her breath away and causes her to have some numbness in her left arm.  Most of her symptoms do not occur with activity although some have.  She also has had intermittent episodes of rapid heartbeats.  She wore a monitor recently which only showed sinus tachycardia with a rate maximum  around 150 bpm.  The 150 beat heart rate is certainly suggestive of atrial flutter however the tracings do not show an acute onset nor are there any obvious flutter waves.  She has no prior EKG showing atrial flutter.  She has had emergency room visits in the past for tachycardia and by the time she arrives only sinus tachycardia has been noted.  She describes 2 episodes where her heart rate by her Fitbit was over 180 and at one time over 190.  She also describes 1 particular day where she believes she passed out twice.  This was in a parking lot while chasing a dog that she was walking.   she had no prodrome of symptoms of tachycardia.  She really does not have many of the details of the event.  She did not seek care at that time.    She has not had any documented DVT or pulmonary emboli.  At some point she was told not to eat green leafy vegetables for fear this could increase her risk for blood clots.  She does have a history of esophagitis but is been lost to follow-up to GI.      Problem List:   Patient Active Problem List   Diagnosis    Allergic contact dermatitis due to animal dander    Alternating exotropia    Arthralgia of multiple sites    Attention deficit hyperactivity disorder (ADHD)    Depression    Fibromyalgia    Esophagitis, [place] grade A    Migraine    SLE (systemic lupus erythematosus) (CMS/[facility], located within [facility])    Peripheral neuropathy    Urinary frequency        Meds:   Current Outpatient Medications on File Prior to Visit   Medication Sig Dispense Refill    albuterol 90 mcg/actuation inhaler Inhale 2 puffs every 4 hours if needed for shortness of breath. 18 g 11    Benlysta 200 mg/mL injection Inject 1 mL (200 mg) under the skin 1 (one) time per week. 12 mL 3    buPROPion XL (Wellbutrin XL) 150 mg 24 hr tablet Take 1 tablet (150 mg) by mouth once daily. 90 tablet 3    CALCIUM ORAL Take 50 mg by mouth once daily.      cholecalciferol (Vitamin D-3) 25 MCG (1000 UT) capsule Take 1 capsule (25 mcg) by mouth  once daily.      fluconazole (Diflucan) 150 mg tablet Take 1 tablet (150 mg) by mouth every 3rd day if needed (yeast infection). Take 1 PO x1, can repeat again in 72 hours if not resolved 2 tablet 0    hydroxychloroquine (Plaquenil) 200 mg tablet Take 1 tablet (200 mg) by mouth once daily. 30 tablet 5    MAGNESIUM CARBONATE ORAL Take by mouth once daily.      multivitamin capsule Take 1 capsule by mouth once daily.      PreviDent 5000 Booster Plus 1.1 % dental paste BRUSH ON TEETH AT LEAST ONCE PER DAY FOR 2 MINUTES. SPIT OUT AFTER. DO NOT RINSE      [DISCONTINUED] RIBOFLAVIN, VITAMIN B2, ORAL Take by mouth once daily.       No current facility-administered medications on file prior to visit.       All:   Allergies   Allergen Reactions    Dog Dander Anaphylaxis    Bee Pollen Unknown    Cat Dander Unknown    Other Unknown    Ragweed Unknown       Fam Hx:   Family History   Problem Relation Name Age of Onset    No Known Problems Mother      No Known Problems Father         Soc Hx:   Social History     Socioeconomic History    Marital status:      Spouse name: Not on file    Number of children: Not on file    Years of education: Not on file    Highest education level: Not on file   Occupational History    Not on file   Tobacco Use    Smoking status: Not on file    Smokeless tobacco: Never   Substance and Sexual Activity    Alcohol use: Never    Drug use: Never    Sexual activity: Not on file   Other Topics Concern    Not on file   Social History Narrative    Not on file     Social Determinants of Health     Financial Resource Strain: High Risk (4/17/2023)    Overall Financial Resource Strain (CARDIA)     Difficulty of Paying Living Expenses: Very hard   Food Insecurity: No Food Insecurity (4/17/2023)    Hunger Vital Sign     Worried About Running Out of Food in the Last Year: Never true     Ran Out of Food in the Last Year: Never true   Transportation Needs: No Transportation Needs (4/17/2023)    PRAPARE -  Transportation     Lack of Transportation (Medical): No     Lack of Transportation (Non-Medical): No   Physical Activity: Sufficiently Active (4/17/2023)    Exercise Vital Sign     Days of Exercise per Week: 6 days     Minutes of Exercise per Session: 120 min   Stress: Stress Concern Present (4/17/2023)    [employer] [place] [employer] - Occupational Stress Questionnaire     Feeling of Stress : Very much   Social Connections: Socially Integrated (4/17/2023)    Social Connection and Isolation Panel [NHANES]     Frequency of Communication with Friends and Family: Three times a week     Frequency of Social Gatherings with Friends and Family: Once a week     Attends Temple Services: More than 4 times per year     Active Member of Clubs or Organizations: Yes     Attends Club or Organization Meetings: More than 4 times per year     Marital Status:    Intimate Partner Violence: Not At Risk (4/17/2023)    Humiliation, Afraid, Rape, and Kick questionnaire     Fear of Current or Ex-Partner: No     Emotionally Abused: No     Physically Abused: No     Sexually Abused: No   Housing Stability: High Risk (4/17/2023)    Housing Stability Vital Sign     Unable to Pay for Housing in the Last Year: Yes     Number of Places Lived in the Last Year: Not on file     Unstable Housing in the Last Year: Yes       ROS    VS: /84 (BP Location: Left arm, Patient Position: Sitting)   Pulse 95   Ht 1.524 m (5')   Wt 74.8 kg (165 lb)   SpO2 98%   BMI 32.22 kg/m²      Physical Exam  Vitals reviewed.   Constitutional:       Appearance: Normal appearance.   Eyes:      Pupils: Pupils are equal, round, and reactive to light.   Neck:      Vascular: No JVD.   Cardiovascular:      Rate and Rhythm: Normal rate and regular rhythm.      Pulses: Normal pulses.      Heart sounds: No murmur heard.     No gallop.   Pulmonary:      Effort: No respiratory distress.      Breath sounds: No wheezing or rales.   Abdominal:      General:  Abdomen is flat. There is no distension.      Palpations: Abdomen is soft.   Musculoskeletal:         General: No swelling.      Right lower leg: No edema.      Left lower leg: No edema.   Neurological:      General: No focal deficit present.      Mental Status: She is alert.   Psychiatric:         Mood and Affect: Mood normal.          Max Escobar MD

## 2023-11-14 ENCOUNTER — OFFICE VISIT (OUTPATIENT)
Dept: OBSTETRICS AND GYNECOLOGY | Facility: CLINIC | Age: 28
End: 2023-11-14
Payer: COMMERCIAL

## 2023-11-14 VITALS
BODY MASS INDEX: 33.45 KG/M2 | HEIGHT: 60 IN | HEART RATE: 88 BPM | TEMPERATURE: 97 F | DIASTOLIC BLOOD PRESSURE: 82 MMHG | SYSTOLIC BLOOD PRESSURE: 116 MMHG | WEIGHT: 170.4 LBS | RESPIRATION RATE: 16 BRPM

## 2023-11-14 DIAGNOSIS — L68.0 HIRSUTISM: ICD-10-CM

## 2023-11-14 DIAGNOSIS — Z00.00 HEALTHCARE MAINTENANCE: ICD-10-CM

## 2023-11-14 DIAGNOSIS — R32 URINARY INCONTINENCE, UNSPECIFIED TYPE: Primary | ICD-10-CM

## 2023-11-14 LAB
ATRIAL RATE: 91 BPM
P AXIS: 49 DEGREES
P OFFSET: 214 MS
P ONSET: 163 MS
POC APPEARANCE, URINE: CLEAR
POC BILIRUBIN, URINE: NEGATIVE
POC BLOOD, URINE: NEGATIVE
POC COLOR, URINE: YELLOW
POC GLUCOSE, URINE: NEGATIVE MG/DL
POC KETONES, URINE: NEGATIVE MG/DL
POC LEUKOCYTES, URINE: NEGATIVE
POC NITRITE,URINE: NEGATIVE
POC PH, URINE: 7.5 PH
POC PROTEIN, URINE: NEGATIVE MG/DL
POC SPECIFIC GRAVITY, URINE: 1.02
POC UROBILINOGEN, URINE: 0.2 EU/DL
PR INTERVAL: 128 MS
Q ONSET: 227 MS
QRS COUNT: 15 BEATS
QRS DURATION: 76 MS
QT INTERVAL: 366 MS
QTC CALCULATION(BAZETT): 450 MS
QTC FREDERICIA: 420 MS
R AXIS: 24 DEGREES
T AXIS: 5 DEGREES
T OFFSET: 410 MS
VENTRICULAR RATE: 91 BPM

## 2023-11-14 PROCEDURE — 51798 US URINE CAPACITY MEASURE: CPT | Performed by: NURSE PRACTITIONER

## 2023-11-14 PROCEDURE — 99203 OFFICE O/P NEW LOW 30 MIN: CPT | Performed by: NURSE PRACTITIONER

## 2023-11-14 PROCEDURE — 1036F TOBACCO NON-USER: CPT | Performed by: NURSE PRACTITIONER

## 2023-11-14 PROCEDURE — 87624 HPV HI-RISK TYP POOLED RSLT: CPT | Performed by: NURSE PRACTITIONER

## 2023-11-14 PROCEDURE — 88175 CYTOPATH C/V AUTO FLUID REDO: CPT | Mod: TC | Performed by: NURSE PRACTITIONER

## 2023-11-14 PROCEDURE — 99213 OFFICE O/P EST LOW 20 MIN: CPT | Performed by: NURSE PRACTITIONER

## 2023-11-14 PROCEDURE — 88175 CYTOPATH C/V AUTO FLUID REDO: CPT | Mod: TC,GCY | Performed by: NURSE PRACTITIONER

## 2023-11-14 PROCEDURE — 88141 CYTOPATH C/V INTERPRET: CPT | Performed by: PATHOLOGY

## 2023-11-14 SDOH — ECONOMIC STABILITY: FOOD INSECURITY: WITHIN THE PAST 12 MONTHS, THE FOOD YOU BOUGHT JUST DIDN'T LAST AND YOU DIDN'T HAVE MONEY TO GET MORE.: NEVER TRUE

## 2023-11-14 SDOH — ECONOMIC STABILITY: FOOD INSECURITY: WITHIN THE PAST 12 MONTHS, YOU WORRIED THAT YOUR FOOD WOULD RUN OUT BEFORE YOU GOT MONEY TO BUY MORE.: NEVER TRUE

## 2023-11-14 ASSESSMENT — PATIENT HEALTH QUESTIONNAIRE - PHQ9
SUM OF ALL RESPONSES TO PHQ9 QUESTIONS 1 AND 2: 0
1. LITTLE INTEREST OR PLEASURE IN DOING THINGS: NOT AT ALL
2. FEELING DOWN, DEPRESSED OR HOPELESS: NOT AT ALL

## 2023-11-14 ASSESSMENT — ENCOUNTER SYMPTOMS
LOSS OF SENSATION IN FEET: 0
DEPRESSION: 0
OCCASIONAL FEELINGS OF UNSTEADINESS: 0

## 2023-11-14 ASSESSMENT — COLUMBIA-SUICIDE SEVERITY RATING SCALE - C-SSRS
6. HAVE YOU EVER DONE ANYTHING, STARTED TO DO ANYTHING, OR PREPARED TO DO ANYTHING TO END YOUR LIFE?: NO
1. IN THE PAST MONTH, HAVE YOU WISHED YOU WERE DEAD OR WISHED YOU COULD GO TO SLEEP AND NOT WAKE UP?: NO
2. HAVE YOU ACTUALLY HAD ANY THOUGHTS OF KILLING YOURSELF?: NO

## 2023-11-14 ASSESSMENT — PAIN SCALES - GENERAL: PAINLEVEL: 0-NO PAIN

## 2023-11-14 NOTE — PROGRESS NOTES
Referred by: PCP  Joe Zaidi DO         CHIEF COMPLAINT:  urinary frequency          HISTORY OF PRESENT ILLNESS:  This is a  28 y.o. y.o. female, referred by Dr. Zaidi who presents with urinary frequency          Specifically, she describes the following pelvic floor symptoms:           Prolapse: No          Incontinence:  No          Urinary Symptoms:       - Frequency:  Yes             # Voids: every 1-2 hours        - Nocturia: Yes             # Voids:  every 3 hours        - Urgency:  No       - Incomplete emptying:  No       - Hesitancy:  No       - Pain with voiding:  No       - Postvoid dribbling:  No       - Excessive fluid intake: No       - Drinks 48 oz of water daily and one cup of water                History:       - Recurrent UTI:  No       - Hematuria:  No       - Stones:  No       - Kidney Disease:  No                  Bowel Symptoms:       - Regular: Yes       - Diarrhea:No       - Constipation: No       - Flatus Incontinence:  No       - Fecal urgency:   No        Gyn History:  - Periods monthly and regular  - Pap up to date: No              History of abnormal pap: Yes  - Pt not interested in STD testing   - Sexually active:    Dyspareunia: Yes              Other issues: deep belly pain with intercourse   - Number of prior vaginal deliveries: 0     OB History    No obstetric history on file.                      Past Medical History  Medical History        Past Medical History:   Diagnosis Date    Anesthesia of skin 10/16/2017     Numbness in feet    Encounter for contraceptive management, unspecified 09/10/2020     Contraception management    Encounter for gynecological examination (general) (routine) without abnormal findings 12/17/2021     Well woman exam    Gastro-esophageal reflux disease without esophagitis 02/04/2022     GERD without esophagitis    Migraine, unspecified, not intractable, without status migrainosus 10/16/2017     Migraine    Other specified cough 08/12/2019      Productive cough    Other specified symptoms and signs involving the digestive system and abdomen 06/10/2020     Irregular bowel habits    Personal history of diseases of the skin and subcutaneous tissue 09/10/2020     History of acne    Personal history of other diseases of the digestive system 06/10/2020     History of rectal bleeding    Personal history of other diseases of the digestive system 06/10/2020     History of gastroesophageal reflux (GERD)    Personal history of other diseases of the digestive system 04/27/2018     History of dental abscess    Personal history of other diseases of the nervous system and sense organs       History of itching of eye    Personal history of other mental and behavioral disorders 10/16/2017     History of depression    Personal history of other specified conditions 08/01/2022     History of urinary incontinence    Systemic involvement of connective tissue, unspecified (CMS/HCC) 06/06/2018     Undifferentiated connective tissue disease            Surgical History  Surgical History         Past Surgical History:   Procedure Laterality Date    EYE SURGERY   10/16/2017     Eye Surgery    FOOT SURGERY   10/16/2017     Foot Surgery    TONSILLECTOMY   10/16/2017     Tonsillectomy With Adenoidectomy            Social History  She reports that she has never smoked. She has never used smokeless tobacco. She reports that she does not drink alcohol and does not use drugs.     Family History  Family History          Family History   Problem Relation Name Age of Onset    No Known Problems Mother        No Known Problems Father                Allergies  Dog dander, Bee pollen, Cat dander, Other, and Ragweed     PHYSICAL EXAMINATION:  No LMP recorded.  Body mass index is 33.28 kg/m².  General Appearance: well appearing  Neuro: Alert and oriented      Pelvic:  Genitourinary:  normal external genitalia, Bartholin's glands negative, Hanska's glands negative  Urethra   normal meatus,  non-tender, no periurethral mass  Vaginal mucosa  normal  Cervix normal  Uterus normal size, nontender  Adnexae  negative nontender, no masses  Atrophy negative  Pelvic floor muscle pain 3 out of 10 bilateral pubococcygeus and iliococcygeus      POP-Q (in supine position):  No prolapse      Rectal: no hemorrhoids, fissures or masses        IMPRESSION AND PLAN:  Leah Scanlon is a 28 y.o. who presents with OAB, Hirsutism and Well woman visit with routine gynecologic exam, cervical cancer screening     Diagnoses:   #1 OAB  #2 Well woman visit with routine gynecologic exam, cervical cancer screening  #3 Hirsutism     Plan:   1. Well woman visit with routine gynecologic exam, cervical cancer screening  - Normal pelvic exam today.   - Pap collected and will call the patient in 2+ weeks with the cytology report if results are abnormal.       2. OAB  - discussed etiology of OAB and potential management options  - reviewed bladder health recommendations (fluid intake, avoiding bladder triggers)  - discussed treatment options: PFPT, medications, PTNS, intradetrusor botox, SNM  - Referral for PFPT placed  - Information provided to patient over PFPT therapist and locations   - All questions and concerns were answered and addressed.    - The patient expressed understanding and agrees with the plan.   - Given pain on exam and pain with intercouse, I do believe OAB is pelvic floor in origin and she will have good results from PFPT     3. Hirsutism  - Ordered Estradiol, prolactin, FSH & LH labs      Follow-up in 4-6 months with MARIE Mccabee Attestation  By signing my name below, Sharmaine BAH Scribe   attest that this documentation has been prepared under the direction and in the presence of MARIE Mccabe.      11/14/2023    ILorena, personally performed the services described in the documentation as scribed in my presence and confirm it is both complete and  accurate.

## 2023-11-27 DIAGNOSIS — M25.50 ARTHRALGIA OF MULTIPLE SITES: Primary | ICD-10-CM

## 2023-11-27 DIAGNOSIS — F32.1 CURRENT MODERATE EPISODE OF MAJOR DEPRESSIVE DISORDER, UNSPECIFIED WHETHER RECURRENT (MULTI): ICD-10-CM

## 2023-11-27 RX ORDER — MELOXICAM 15 MG/1
15 TABLET ORAL DAILY
Qty: 30 TABLET | Refills: 2 | Status: SHIPPED | OUTPATIENT
Start: 2023-11-27 | End: 2023-11-29 | Stop reason: SDUPTHER

## 2023-11-27 RX ORDER — BUPROPION HYDROCHLORIDE 300 MG/1
300 TABLET ORAL DAILY
Qty: 90 TABLET | Refills: 0 | Status: SHIPPED | OUTPATIENT
Start: 2023-11-27 | End: 2024-02-21 | Stop reason: SDUPTHER

## 2023-11-29 DIAGNOSIS — M25.50 ARTHRALGIA OF MULTIPLE SITES: ICD-10-CM

## 2023-11-29 DIAGNOSIS — K20.80 ESOPHAGITIS, LOS ANGELES GRADE A: Primary | ICD-10-CM

## 2023-11-29 LAB
CYTOLOGY CMNT CVX/VAG CYTO-IMP: NORMAL
HPV HR 12 DNA GENITAL QL NAA+PROBE: NEGATIVE
HPV HR GENOTYPES PNL CVX NAA+PROBE: NEGATIVE
HPV16 DNA SPEC QL NAA+PROBE: NEGATIVE
HPV18 DNA SPEC QL NAA+PROBE: NEGATIVE
LAB AP HPV GENOTYPE QUESTION: YES
LAB AP HPV HR: NORMAL
LABORATORY COMMENT REPORT: NORMAL
PATH REPORT.TOTAL CANCER: NORMAL

## 2023-11-29 RX ORDER — MELOXICAM 15 MG/1
15 TABLET ORAL DAILY
Qty: 30 TABLET | Refills: 2 | Status: SHIPPED | OUTPATIENT
Start: 2023-11-29 | End: 2024-02-27

## 2023-12-11 ENCOUNTER — APPOINTMENT (OUTPATIENT)
Dept: CARDIOLOGY | Facility: CLINIC | Age: 28
End: 2023-12-11
Payer: COMMERCIAL

## 2023-12-12 ENCOUNTER — ANCILLARY PROCEDURE (OUTPATIENT)
Dept: RADIOLOGY | Facility: CLINIC | Age: 28
End: 2023-12-12
Payer: COMMERCIAL

## 2023-12-12 ENCOUNTER — TELEPHONE (OUTPATIENT)
Dept: CARDIOLOGY | Facility: HOSPITAL | Age: 28
End: 2023-12-12

## 2023-12-12 ENCOUNTER — LAB (OUTPATIENT)
Dept: LAB | Facility: LAB | Age: 28
End: 2023-12-12
Payer: COMMERCIAL

## 2023-12-12 DIAGNOSIS — R00.0 TACHYCARDIA: ICD-10-CM

## 2023-12-12 DIAGNOSIS — M25.562 CHRONIC PAIN OF LEFT KNEE: ICD-10-CM

## 2023-12-12 DIAGNOSIS — L68.0 HIRSUTISM: ICD-10-CM

## 2023-12-12 DIAGNOSIS — G89.29 CHRONIC PAIN OF LEFT KNEE: ICD-10-CM

## 2023-12-12 DIAGNOSIS — M32.9 SYSTEMIC LUPUS ERYTHEMATOSUS, UNSPECIFIED SLE TYPE, UNSPECIFIED ORGAN INVOLVEMENT STATUS (MULTI): ICD-10-CM

## 2023-12-12 LAB
ALBUMIN SERPL BCP-MCNC: 4.3 G/DL (ref 3.4–5)
ALP SERPL-CCNC: 50 U/L (ref 33–110)
ALT SERPL W P-5'-P-CCNC: 14 U/L (ref 7–45)
ANION GAP SERPL CALC-SCNC: 10 MMOL/L (ref 10–20)
AST SERPL W P-5'-P-CCNC: 16 U/L (ref 9–39)
BILIRUB SERPL-MCNC: 0.8 MG/DL (ref 0–1.2)
BUN SERPL-MCNC: 11 MG/DL (ref 6–23)
CALCIUM SERPL-MCNC: 9 MG/DL (ref 8.6–10.3)
CHLORIDE SERPL-SCNC: 104 MMOL/L (ref 98–107)
CHOLEST SERPL-MCNC: 121 MG/DL (ref 0–199)
CHOLESTEROL/HDL RATIO: 2
CO2 SERPL-SCNC: 22 MMOL/L (ref 21–32)
CREAT SERPL-MCNC: 0.64 MG/DL (ref 0.5–1.05)
CREAT UR-MCNC: 20.9 MG/DL (ref 20–320)
ERYTHROCYTE [DISTWIDTH] IN BLOOD BY AUTOMATED COUNT: 13.6 % (ref 11.5–14.5)
ESTRADIOL SERPL-MCNC: 101 PG/ML
FSH SERPL-ACNC: 3.8 IU/L
GFR SERPL CREATININE-BSD FRML MDRD: >90 ML/MIN/1.73M*2
GLUCOSE SERPL-MCNC: 85 MG/DL (ref 74–99)
HCT VFR BLD AUTO: 42.2 % (ref 36–46)
HDLC SERPL-MCNC: 59.7 MG/DL
HGB BLD-MCNC: 13.6 G/DL (ref 12–16)
LDLC SERPL CALC-MCNC: 48 MG/DL
LH SERPL-ACNC: 3.1 IU/L
MAGNESIUM SERPL-MCNC: 1.96 MG/DL (ref 1.6–2.4)
MCH RBC QN AUTO: 25.3 PG (ref 26–34)
MCHC RBC AUTO-ENTMCNC: 32.2 G/DL (ref 32–36)
MCV RBC AUTO: 79 FL (ref 80–100)
NON HDL CHOLESTEROL: 61 MG/DL (ref 0–149)
NRBC BLD-RTO: 0 /100 WBCS (ref 0–0)
PLATELET # BLD AUTO: 285 X10*3/UL (ref 150–450)
POTASSIUM SERPL-SCNC: 3.7 MMOL/L (ref 3.5–5.3)
PROLACTIN SERPL-MCNC: 21.7 UG/L (ref 3–20)
PROT SERPL-MCNC: 6.8 G/DL (ref 6.4–8.2)
PROT UR-ACNC: <4 MG/DL (ref 5–24)
PROT/CREAT UR: ABNORMAL MG/G{CREAT}
RBC # BLD AUTO: 5.37 X10*6/UL (ref 4–5.2)
SODIUM SERPL-SCNC: 132 MMOL/L (ref 136–145)
TRIGL SERPL-MCNC: 66 MG/DL (ref 0–149)
TSH SERPL-ACNC: 2.5 MIU/L (ref 0.44–3.98)
VLDL: 13 MG/DL (ref 0–40)
WBC # BLD AUTO: 7.3 X10*3/UL (ref 4.4–11.3)

## 2023-12-12 PROCEDURE — 83002 ASSAY OF GONADOTROPIN (LH): CPT

## 2023-12-12 PROCEDURE — 83001 ASSAY OF GONADOTROPIN (FSH): CPT

## 2023-12-12 PROCEDURE — 82670 ASSAY OF TOTAL ESTRADIOL: CPT

## 2023-12-12 PROCEDURE — 82570 ASSAY OF URINE CREATININE: CPT

## 2023-12-12 PROCEDURE — 73562 X-RAY EXAM OF KNEE 3: CPT | Mod: LT

## 2023-12-12 PROCEDURE — 80061 LIPID PANEL: CPT

## 2023-12-12 PROCEDURE — 36415 COLL VENOUS BLD VENIPUNCTURE: CPT

## 2023-12-12 PROCEDURE — 85027 COMPLETE CBC AUTOMATED: CPT

## 2023-12-12 PROCEDURE — 80053 COMPREHEN METABOLIC PANEL: CPT

## 2023-12-12 PROCEDURE — 84146 ASSAY OF PROLACTIN: CPT

## 2023-12-12 PROCEDURE — 73562 X-RAY EXAM OF KNEE 3: CPT | Mod: LEFT SIDE | Performed by: RADIOLOGY

## 2023-12-12 PROCEDURE — 83735 ASSAY OF MAGNESIUM: CPT

## 2023-12-12 PROCEDURE — 84156 ASSAY OF PROTEIN URINE: CPT

## 2023-12-12 PROCEDURE — 84443 ASSAY THYROID STIM HORMONE: CPT

## 2023-12-12 NOTE — TELEPHONE ENCOUNTER
I spoke with patient and informed her. She stated she will call back in January to schedule the echo.

## 2023-12-26 ENCOUNTER — OFFICE VISIT (OUTPATIENT)
Dept: GASTROENTEROLOGY | Facility: CLINIC | Age: 28
End: 2023-12-26
Payer: COMMERCIAL

## 2023-12-26 VITALS
SYSTOLIC BLOOD PRESSURE: 129 MMHG | HEIGHT: 60 IN | WEIGHT: 168 LBS | RESPIRATION RATE: 18 BRPM | HEART RATE: 88 BPM | BODY MASS INDEX: 32.98 KG/M2 | OXYGEN SATURATION: 97 % | DIASTOLIC BLOOD PRESSURE: 84 MMHG

## 2023-12-26 DIAGNOSIS — K20.80 ESOPHAGITIS, LOS ANGELES GRADE A: ICD-10-CM

## 2023-12-26 PROCEDURE — 99203 OFFICE O/P NEW LOW 30 MIN: CPT | Performed by: STUDENT IN AN ORGANIZED HEALTH CARE EDUCATION/TRAINING PROGRAM

## 2023-12-26 PROCEDURE — 1036F TOBACCO NON-USER: CPT | Performed by: STUDENT IN AN ORGANIZED HEALTH CARE EDUCATION/TRAINING PROGRAM

## 2023-12-26 NOTE — PROGRESS NOTES
"CC: Abdominal pain, GERD, atypical chest pain.     History of Present Illness:   Leah Scanlon \"Bea\" is a 28 y.o. female with a PMH of obesity, migraines, depression/anxiety, ADHD, SLE, GERD who presents to clinic with abdominal pain, GERD and atypical chest pain. The atypical chest pain episodes occur a few times per week. She describes it as sharp.  Is located in the left chest.  Cardiac workup has been normal.  She was told that maybe it is GI in nature.  She has had significant GI workup in the past by multiple providers.  Last by Dr. Gupta in 2020.  Scopes as per below.  She is on Nexium, but does not take it regularly.  She takes it at bedtime.  She does not drink pop or alcohol.  Has cut out dairy products.  No issues with her bowels. Stress level are high.  Does not sleep well.  Patient recently started working with a therapist more.  Patient states that she gets some exercise (walking).      EGD 2020: gastritis, BRAVO placement (WNL).  Colonoscopy 2020: Hemorrhoids.     Review of Systems  ROS Negative unless otherwise stated above.    Past Medical/Surgical History  Past Medical History:   Diagnosis Date    Anesthesia of skin 10/16/2017    Numbness in feet    Encounter for contraceptive management, unspecified 09/10/2020    Contraception management    Encounter for gynecological examination (general) (routine) without abnormal findings 12/17/2021    Well woman exam    Gastro-esophageal reflux disease without esophagitis 02/04/2022    GERD without esophagitis    Migraine, unspecified, not intractable, without status migrainosus 10/16/2017    Migraine    Other specified cough 08/12/2019    Productive cough    Other specified symptoms and signs involving the digestive system and abdomen 06/10/2020    Irregular bowel habits    Personal history of diseases of the skin and subcutaneous tissue 09/10/2020    History of acne    Personal history of other diseases of the digestive system 06/10/2020    History of " rectal bleeding    Personal history of other diseases of the digestive system 06/10/2020    History of gastroesophageal reflux (GERD)    Personal history of other diseases of the digestive system 04/27/2018    History of dental abscess    Personal history of other diseases of the nervous system and sense organs     History of itching of eye    Personal history of other mental and behavioral disorders 10/16/2017    History of depression    Personal history of other specified conditions 08/01/2022    History of urinary incontinence    Systemic involvement of connective tissue, unspecified (CMS/HCC) 06/06/2018    Undifferentiated connective tissue disease      Past Surgical History:   Procedure Laterality Date    EYE SURGERY  10/16/2017    Eye Surgery    FOOT SURGERY  10/16/2017    Foot Surgery    TONSILLECTOMY  10/16/2017    Tonsillectomy With Adenoidectomy        Social History   reports that she has never smoked. She has never used smokeless tobacco. She reports that she does not drink alcohol and does not use drugs.     Family History  family history includes No Known Problems in her father and mother.     Allergies  Allergies   Allergen Reactions    Dog Dander Anaphylaxis    Bee Pollen Unknown    Cat Dander Unknown    Other Unknown    Ragweed Unknown       Medications  Current Outpatient Medications   Medication Instructions    albuterol 90 mcg/actuation inhaler 2 puffs, inhalation, Every 4 hours PRN    Benlysta 200 mg, subcutaneous, Weekly    buPROPion XL (WELLBUTRIN XL) 300 mg, oral, Daily    CALCIUM ORAL 50 mg, oral, Daily RT    cholecalciferol (VITAMIN D-3) 25 mcg, oral, Daily    fluconazole (DIFLUCAN) 150 mg, oral, Every 72 hours PRN, Take 1 PO x1, can repeat again in 72 hours if not resolved    hydroxychloroquine (PLAQUENIL) 200 mg, oral, Daily    MAGNESIUM CARBONATE ORAL oral, Daily RT    meloxicam (MOBIC) 15 mg, oral, Daily    multivitamin capsule 1 capsule, oral, Daily RT    PreviDent 5000 Booster Plus  "1.1 % dental paste BRUSH ON TEETH AT LEAST ONCE PER DAY FOR 2 MINUTES. SPIT OUT AFTER. DO NOT RINSE        Objective   Visit Vitals  /84   Pulse 88   Resp 18        General: A&Ox3, NAD.  HEENT: AT/NC.   CV: RRR. No murmur.  Resp: CTA bilaterally. No wheezing, rhonchi or rales.   GI: Soft, NT/ND.   Extrem: No edema. Pulses intact.  Skin: No Jaundice.   Neuro: No focal deficits.   Psych: Normal mood and affect.     Lab Results   Component Value Date    WBC 7.3 12/12/2023    HGB 13.6 12/12/2023    HCT 42.2 12/12/2023    MCV 79 (L) 12/12/2023     12/12/2023       Chemistry    Lab Results   Component Value Date/Time     (L) 12/12/2023 0649    K 3.7 12/12/2023 0649     12/12/2023 0649    CO2 22 12/12/2023 0649    BUN 11 12/12/2023 0649    CREATININE 0.64 12/12/2023 0649    Lab Results   Component Value Date/Time    CALCIUM 9.0 12/12/2023 0649    ALKPHOS 50 12/12/2023 0649    AST 16 12/12/2023 0649    ALT 14 12/12/2023 0649    BILITOT 0.8 12/12/2023 0649             ASSESSMENT/PLAN  Leah Scanlon \"Bea\" is a 28 y.o. female with a PMH of obesity, migraines, ADHD, depression/anxiety, SLE, GERD who presents to clinic with abdominal pain, GERD and atypical chest pain.  GERD and abdominal pain improved.  Atypical chest pain is her biggest complaint.  Occurs a couple times weekly.  Cardiac workup negative.    EGD 2020: gastritis, BRAVO placement (WNL).  Colonoscopy 2020: Hemorrhoids.     Suspect patient's symptoms are functional in nature.  However, offered esophageal manometry (patient declines at this time).  Continue PPI (advised to take ~20 minutes before meal).  Patient must work on stress reduction, exercise.  Weight loss.      William Tafoya, DO    "

## 2024-02-17 ENCOUNTER — OFFICE VISIT (OUTPATIENT)
Dept: OPHTHALMOLOGY | Facility: CLINIC | Age: 29
End: 2024-02-17
Payer: COMMERCIAL

## 2024-02-17 DIAGNOSIS — H52.203 MYOPIA OF BOTH EYES WITH ASTIGMATISM: ICD-10-CM

## 2024-02-17 DIAGNOSIS — H52.13 MYOPIA OF BOTH EYES WITH ASTIGMATISM: ICD-10-CM

## 2024-02-17 DIAGNOSIS — H50.15 ALTERNATING EXOTROPIA: ICD-10-CM

## 2024-02-17 DIAGNOSIS — L23.81 ALLERGIC CONTACT DERMATITIS DUE TO ANIMAL DANDER: ICD-10-CM

## 2024-02-17 DIAGNOSIS — Z79.899 LONG-TERM USE OF PLAQUENIL: Primary | ICD-10-CM

## 2024-02-17 DIAGNOSIS — M32.9 SYSTEMIC LUPUS ERYTHEMATOSUS, UNSPECIFIED SLE TYPE, UNSPECIFIED ORGAN INVOLVEMENT STATUS (MULTI): ICD-10-CM

## 2024-02-17 PROCEDURE — 92134 CPTRZ OPH DX IMG PST SGM RTA: CPT | Performed by: OPTOMETRIST

## 2024-02-17 PROCEDURE — 92015 DETERMINE REFRACTIVE STATE: CPT | Performed by: OPTOMETRIST

## 2024-02-17 PROCEDURE — 92004 COMPRE OPH EXAM NEW PT 1/>: CPT | Performed by: OPTOMETRIST

## 2024-02-17 PROCEDURE — 92083 EXTENDED VISUAL FIELD XM: CPT | Performed by: OPTOMETRIST

## 2024-02-17 ASSESSMENT — REFRACTION_MANIFEST
OD_SPHERE: -1.25
OD_AXIS: 165
OD_CYLINDER: -0.50
OS_SPHERE: -0.50
OS_CYLINDER: -0.50
OS_AXIS: 060

## 2024-02-17 ASSESSMENT — TONOMETRY
OD_IOP_MMHG: 12
OS_IOP_MMHG: 12
IOP_METHOD: GOLDMANN APPLANATION

## 2024-02-17 ASSESSMENT — EXTERNAL EXAM - LEFT EYE: OS_EXAM: NORMAL

## 2024-02-17 ASSESSMENT — REFRACTION_WEARINGRX
OD_AXIS: 155
OD_CYLINDER: -0.50
OD_SPHERE: -1.50
OS_AXIS: 020
OS_CYLINDER: -0.50
OS_SPHERE: -0.50

## 2024-02-17 ASSESSMENT — ENCOUNTER SYMPTOMS
ALLERGIC/IMMUNOLOGIC COMMENTS: SLE
PSYCHIATRIC NEGATIVE: 0
ALLERGIC/IMMUNOLOGIC NEGATIVE: 1
ENDOCRINE NEGATIVE: 0
CARDIOVASCULAR NEGATIVE: 0
MUSCULOSKELETAL NEGATIVE: 0
RESPIRATORY NEGATIVE: 0
HEMATOLOGIC/LYMPHATIC NEGATIVE: 0
EYES NEGATIVE: 0
CONSTITUTIONAL NEGATIVE: 0
NEUROLOGICAL NEGATIVE: 0
GASTROINTESTINAL NEGATIVE: 0

## 2024-02-17 ASSESSMENT — CUP TO DISC RATIO
OD_RATIO: .3
OS_RATIO: .3

## 2024-02-17 ASSESSMENT — VISUAL ACUITY
METHOD: SNELLEN - LINEAR
OS_CC: 20/20
OD_CC: 20/20
OD_CC+: -1
CORRECTION_TYPE: GLASSES
OS_CC+: -1

## 2024-02-17 ASSESSMENT — SLIT LAMP EXAM - LIDS
COMMENTS: GOOD POSITION
COMMENTS: GOOD POSITION

## 2024-02-17 ASSESSMENT — EXTERNAL EXAM - RIGHT EYE: OD_EXAM: NORMAL

## 2024-02-17 ASSESSMENT — CONF VISUAL FIELD: COMMENTS: SEE HVF

## 2024-02-17 NOTE — PROGRESS NOTES
SLE.  Plaquenil therapy.  The patient has taken 200 mg of hydroxychloroquine (Plaquenil) QD for 6 years.  Studies reveal that the risk of maculopathy when taking Plaquenil is 0% (0-5 years), 1% (over 5 years), 2% (over 10 years), and 20% cumulative,   or 4% annual incidence  (over 20 years) respectively.  Annual testing with 10-2 threshold visual field perimetry, as well as spectral domain optical coherence tomography of the macula is recommended.        Optical coherence tomography of the macula revealed:    OD: Normal foveal contour, photoreceptor, retinal pigment epithelium, IS/OS junction, central field 250 was 247 micron.  Vitreous base visualized.   OS:  Normal foveal contour, photoreceptor, retinal pigment epithelium, IS/OS junction, central field 250 was 251 micron.  Vitreous base visualized.     A Jacobs 10-2 threshold visual field test was done.  Results were:   OD: the absence of scotoma, pattern standard deviation 1.33 was 2.20 dB   OS: the absence of scotoma, pattern standard deviation 1.07 was 3.75 dB   A spectacle prescription was given at the patient`s request.  Olopatadine was prescribed for chronic ocular allergies.  Use BID OU.      Eyes are now burning and sensitive to light. Jaymie diamond provided use 4x/day, is using Systane TID, is using humidifier in bedroom RTC 1 months for all dry eye tests.    Intermittent left exotropia OS. Referred to pediatrics for possible surgical intevention as wearing glasses doesn't help.     The patient was asked to return to our clinic in one year or sooner if ocular or vision changes occur.   1 year plaquenil and full exam.  1-2 months all dry eye tests.

## 2024-02-19 ENCOUNTER — OFFICE VISIT (OUTPATIENT)
Dept: RHEUMATOLOGY | Facility: CLINIC | Age: 29
End: 2024-02-19
Payer: COMMERCIAL

## 2024-02-19 VITALS
WEIGHT: 164.4 LBS | DIASTOLIC BLOOD PRESSURE: 78 MMHG | TEMPERATURE: 98 F | HEART RATE: 84 BPM | SYSTOLIC BLOOD PRESSURE: 106 MMHG | BODY MASS INDEX: 32.28 KG/M2 | OXYGEN SATURATION: 98 % | HEIGHT: 60 IN

## 2024-02-19 DIAGNOSIS — M25.362 KNEE INSTABILITY, LEFT: ICD-10-CM

## 2024-02-19 DIAGNOSIS — G89.29 CHRONIC PAIN OF LEFT KNEE: Primary | ICD-10-CM

## 2024-02-19 DIAGNOSIS — M32.9 SYSTEMIC LUPUS ERYTHEMATOSUS, UNSPECIFIED SLE TYPE, UNSPECIFIED ORGAN INVOLVEMENT STATUS (MULTI): ICD-10-CM

## 2024-02-19 DIAGNOSIS — M25.562 CHRONIC PAIN OF LEFT KNEE: Primary | ICD-10-CM

## 2024-02-19 PROBLEM — M94.0 COSTOCHONDRITIS: Status: ACTIVE | Noted: 2024-02-19

## 2024-02-19 PROBLEM — S61.459A DOG BITE OF HAND: Status: ACTIVE | Noted: 2024-02-19

## 2024-02-19 PROBLEM — K04.7 DENTAL ABSCESS: Status: ACTIVE | Noted: 2024-02-19

## 2024-02-19 PROBLEM — H51.8 DISSOCIATED VERTICAL DEVIATION: Status: ACTIVE | Noted: 2024-02-19

## 2024-02-19 PROBLEM — J20.9 ACUTE BRONCHITIS: Status: ACTIVE | Noted: 2024-02-19

## 2024-02-19 PROBLEM — Z86.59 HISTORY OF DEPRESSION: Status: ACTIVE | Noted: 2024-02-19

## 2024-02-19 PROBLEM — H10.30 ACUTE INFECTIVE CONJUNCTIVITIS: Status: ACTIVE | Noted: 2024-02-19

## 2024-02-19 PROBLEM — U07.1 DISEASE DUE TO SEVERE ACUTE RESPIRATORY SYNDROME CORONAVIRUS 2 (SARS-COV-2): Status: ACTIVE | Noted: 2024-02-19
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
## 2024-02-19 PROBLEM — L70.9 ACNE: Status: ACTIVE | Noted: 2024-02-19

## 2024-02-19 PROBLEM — R13.10 DYSPHAGIA: Status: ACTIVE | Noted: 2024-02-19

## 2024-02-19 PROBLEM — R53.83 FATIGUE: Status: ACTIVE | Noted: 2024-02-19

## 2024-02-19 PROBLEM — B37.31 CANDIDIASIS OF VAGINA: Status: ACTIVE | Noted: 2024-02-19

## 2024-02-19 PROBLEM — H10.45 CHRONIC ALLERGIC CONJUNCTIVITIS: Status: ACTIVE | Noted: 2024-02-19

## 2024-02-19 PROCEDURE — 3008F BODY MASS INDEX DOCD: CPT | Performed by: INTERNAL MEDICINE

## 2024-02-19 PROCEDURE — 1036F TOBACCO NON-USER: CPT | Performed by: INTERNAL MEDICINE

## 2024-02-19 PROCEDURE — 99214 OFFICE O/P EST MOD 30 MIN: CPT | Performed by: INTERNAL MEDICINE

## 2024-02-19 RX ORDER — HYDROXYCHLOROQUINE SULFATE 200 MG/1
200 TABLET, FILM COATED ORAL DAILY
Qty: 30 TABLET | Refills: 5 | Status: SHIPPED | OUTPATIENT
Start: 2024-02-19

## 2024-02-19 RX ORDER — BELIMUMAB 200 MG/ML
200 SOLUTION SUBCUTANEOUS
Qty: 12 ML | Refills: 3 | Status: SHIPPED | OUTPATIENT
Start: 2024-02-19 | End: 2024-06-11 | Stop reason: SDUPTHER

## 2024-02-19 NOTE — PATIENT INSTRUCTIONS
Referred for orthopedic evaluation of left knee.  Check labs 4/24: CBC with diff, CMP, ESR, dsDNA, C3, C4, spot urine protein to creatinine ratio.  Follow-up in 4 months.

## 2024-02-19 NOTE — PROGRESS NOTES
Subjective   Patient ID: Bea Scanlon is a 28 y.o. female who presents for follow up re: SLE.    HPI 29 yo F here for f/u re: SLE. She remains on Benlysta 200 mg SC weekly and hydroxychloroquine 200 mg daily.     She is not having much joint pain except left knee pain.  She sometimes feels as though the left knee will buckle.  She wears braces on both knees.    She states she has started walking more and has lost some weight again.  Current BMI is 32.    She still has mild dry eyes for which she uses over-the-counter artificial tears.  She had eye exam with Dr. Davis 2/24.  He recommended iVizia for dry eyes.  She has left eye exotropia-she was referred to pediatric ophthalmology.    Her dentist gave her a high fluoride toothpaste to use for dry mouth.  She is also using a Waterpik and alcohol free mouthwash.    She is currently walking 8 miles per week.  She is starting with weights.  She feels like her left knee pops out of place.    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    She still has Raynaud's but no digital ulcers.     She still c/o urinary frequency.  She does drink 12-16 ounces of water 3 times daily.  She states that she urinates about 3-4 times every hour.  When she lays down to go to bed at night, she gets up 3 times in the first hour to urinate.  After that, she is able to sleep 12 hours without having to get up and urinate.  Urinalysis done August 1, 2022 was normal (no evidence of infection).  She was seen by nurse practitioner and urogynecology 11/23.  She was diagnosed with overactive bladder.  Pelvic floor physical therapy was recommended which she has declined.7    She did get  January 2022.    She was previously on antidepressants since sixth grade.  She states that they never helped.  She currently still works with a psychologist.    She has stopped taking Vyvanse. She has lessened caffeine intake.    Labs 10/17: DILLON  1:320 with negative panel, rheumatoid factor negative, citrulline antibody negative, CBC normal, CMP normal, CRP negative, ESR 8.  Labs 3/23: CMP normal, CBC normal, ESR 7, vitamin B12 and folate normal, double-stranded DNA negative, C3 and C4 normal, spot urine protein to creatinine ratio 0.22  Labs December 2023: CBC normal except MCV 79, CMP normal except sodium 132, cholesterol 121, HDL 59.7, LDL 48, triglycerides 66, TSH 2.5, spot urine protein to creatinine ratio 0    Echo 6/20: normal.    Social history: . does not smoke. Uses alcohol occasionally socially.     Medical problem list:    - SLE- DILLON 1:320 (negative panel), arthritis   - Fibromyalgia-    - History of migraine headaches        ROS:  General: Denies fevers or chills.  CV: Denies chest pain or palpitations.  Denies leg edema.  Lungs: Denies coughing or shortness of breath.  Skin: Denies rashes or nodules.  MS: Denies joint pain or joint swelling.     Objective   Visit Vitals  /78 (BP Location: Left arm, Patient Position: Sitting, BP Cuff Size: Small adult)   Pulse 84   Temp 36.7 °C (98 °F)   Ht 1.524 m (5')   Wt 74.6 kg (164 lb 6.4 oz)   SpO2 98%   BMI 32.11 kg/m²   Smoking Status Never   BSA 1.78 m²        Physical Exam  HEENT: PERRL, EOMI  Neck: Supple, no nodes.  CV: RRR, no MGR.  Lungs: Clear, no rales or wheezes.  Abdomen: Soft, nontender. No hepatosplenomegaly.  Extremities:  No cyanosis, clubbing, or edema.  MS: No synovitis.  Skin: No rashes or nodules.      Assessment/Plan   Problem List Items Addressed This Visit             ICD-10-CM    SLE (systemic lupus erythematosus) (CMS/HCC) M32.9      Problem List Items Addressed This Visit             ICD-10-CM    SLE (systemic lupus erythematosus) (CMS/HCC) M32.9    Relevant Medications    belimumab (Benlysta) 200 mg/mL injection    hydroxychloroquine (Plaquenil) 200 mg tablet    Other Relevant Orders    CBC and Auto Differential    Comprehensive Metabolic Panel    C3 Complement     "C4 Complement    Anti-DNA Antibody, Double-Stranded    Sedimentation Rate    Protein, Urine Random    Creatinine, urine, random    BMI 32.0-32.9,adult Z68.32     Other Visit Diagnoses         Codes    Chronic pain of left knee    -  Primary M25.562, G89.29    Relevant Orders    Referral to Orthopaedic Surgery    Knee instability, left     M25.362    Relevant Orders    Referral to Orthopaedic Surgery            1. SLE-doing better since she resumed her medication early 2023.    2. Left costochondritis (recurrent)- has difficulty tolerating NSAIDs due to GERD. Chest pain not currently present.    3. Abnormal EKG- inverted T waves in leads 3, aVF, V3 and V4. Echo 6/20 unremarkable. She had appointment  with Dr. Escobar in cardiology 11/23 due to recurrent tachycardia.  Holter monitor showed only sinus tachycardia. Echo was recommended.    4. h/o frequent GERD which is occurring despite omeprazole 20 mg daily. Saw Dr. Gupta 7/20. EGD and colonoscopy were unremarkable. Bravo pH monitor was normal. Dr. Gupta did not find a correlation between acid reflux and either chest pain or cough. Dr. Tafoya recommended esophageal manometry 12/23.    5. Fatigue- ? Whether there is element of depression involved. However, she states she was previously on antidepressants for many years and never seemed to help. She still works with a psychologist.   She states that she is depressed because she is \"not like a normal 27-year-old\".    6. BMI 32-currently working on weight loss. She lost 6 pounds since November 2023.    7. Urinary frequency-urinalysis August 1, 2022 was normal. Saw urogynecology 11/23.  Pelvic floor physical therapy was recommended which she has declined.    8.  Left knee pain-she feels like her knee popped out of place.  She was referred for orthopedic evaluation.    Plan:  Referred for orthopedic evaluation of left knee.  Check labs 4/24: CBC with diff, CMP, ESR, dsDNA, C3, C4, spot urine protein to creatinine " ratio.  Follow-up in 4 months.

## 2024-02-21 DIAGNOSIS — F32.1 CURRENT MODERATE EPISODE OF MAJOR DEPRESSIVE DISORDER, UNSPECIFIED WHETHER RECURRENT (MULTI): ICD-10-CM

## 2024-02-21 RX ORDER — BUPROPION HYDROCHLORIDE 300 MG/1
300 TABLET ORAL DAILY
Qty: 90 TABLET | Refills: 1 | Status: SHIPPED | OUTPATIENT
Start: 2024-02-21 | End: 2024-08-19

## 2024-03-11 ENCOUNTER — APPOINTMENT (OUTPATIENT)
Dept: ORTHOPEDIC SURGERY | Facility: CLINIC | Age: 29
End: 2024-03-11
Payer: COMMERCIAL

## 2024-03-25 ENCOUNTER — APPOINTMENT (OUTPATIENT)
Dept: ORTHOPEDIC SURGERY | Facility: CLINIC | Age: 29
End: 2024-03-25
Payer: COMMERCIAL

## 2024-03-25 DIAGNOSIS — R39.9 UTI SYMPTOMS: Primary | ICD-10-CM

## 2024-03-26 ENCOUNTER — LAB (OUTPATIENT)
Dept: LAB | Facility: LAB | Age: 29
End: 2024-03-26
Payer: COMMERCIAL

## 2024-03-26 DIAGNOSIS — M32.9 SYSTEMIC LUPUS ERYTHEMATOSUS, UNSPECIFIED SLE TYPE, UNSPECIFIED ORGAN INVOLVEMENT STATUS (MULTI): ICD-10-CM

## 2024-03-26 DIAGNOSIS — R39.9 UTI SYMPTOMS: ICD-10-CM

## 2024-03-26 LAB
ALBUMIN SERPL BCP-MCNC: 4.6 G/DL (ref 3.4–5)
ALP SERPL-CCNC: 56 U/L (ref 33–110)
ALT SERPL W P-5'-P-CCNC: 11 U/L (ref 7–45)
AMORPH CRY #/AREA UR COMP ASSIST: ABNORMAL /HPF
ANION GAP SERPL CALC-SCNC: 9 MMOL/L (ref 10–20)
AST SERPL W P-5'-P-CCNC: 13 U/L (ref 9–39)
BASOPHILS # BLD AUTO: 0.02 X10*3/UL (ref 0–0.1)
BASOPHILS NFR BLD AUTO: 0.2 %
BILIRUB SERPL-MCNC: 0.6 MG/DL (ref 0–1.2)
BUN SERPL-MCNC: 13 MG/DL (ref 6–23)
C3 SERPL-MCNC: 127 MG/DL (ref 87–200)
C4 SERPL-MCNC: 53 MG/DL (ref 10–50)
CALCIUM SERPL-MCNC: 9.2 MG/DL (ref 8.6–10.3)
CAOX CRY #/AREA UR COMP ASSIST: ABNORMAL /HPF
CHLORIDE SERPL-SCNC: 106 MMOL/L (ref 98–107)
CO2 SERPL-SCNC: 27 MMOL/L (ref 21–32)
CREAT SERPL-MCNC: 0.64 MG/DL (ref 0.5–1.05)
CREAT UR-MCNC: 91.6 MG/DL (ref 20–320)
EGFRCR SERPLBLD CKD-EPI 2021: >90 ML/MIN/1.73M*2
EOSINOPHIL # BLD AUTO: 0.05 X10*3/UL (ref 0–0.7)
EOSINOPHIL NFR BLD AUTO: 0.6 %
ERYTHROCYTE [DISTWIDTH] IN BLOOD BY AUTOMATED COUNT: 13.9 % (ref 11.5–14.5)
ERYTHROCYTE [SEDIMENTATION RATE] IN BLOOD BY WESTERGREN METHOD: 3 MM/H (ref 0–20)
GLUCOSE SERPL-MCNC: 87 MG/DL (ref 74–99)
HCT VFR BLD AUTO: 43.3 % (ref 36–46)
HGB BLD-MCNC: 14 G/DL (ref 12–16)
IMM GRANULOCYTES # BLD AUTO: 0.03 X10*3/UL (ref 0–0.7)
IMM GRANULOCYTES NFR BLD AUTO: 0.4 % (ref 0–0.9)
LYMPHOCYTES # BLD AUTO: 2.2 X10*3/UL (ref 1.2–4.8)
LYMPHOCYTES NFR BLD AUTO: 25.9 %
MCH RBC QN AUTO: 25.5 PG (ref 26–34)
MCHC RBC AUTO-ENTMCNC: 32.3 G/DL (ref 32–36)
MCV RBC AUTO: 79 FL (ref 80–100)
MONOCYTES # BLD AUTO: 0.87 X10*3/UL (ref 0.1–1)
MONOCYTES NFR BLD AUTO: 10.3 %
MUCOUS THREADS #/AREA URNS AUTO: ABNORMAL /LPF
NEUTROPHILS # BLD AUTO: 5.31 X10*3/UL (ref 1.2–7.7)
NEUTROPHILS NFR BLD AUTO: 62.6 %
NRBC BLD-RTO: 0 /100 WBCS (ref 0–0)
PLATELET # BLD AUTO: 285 X10*3/UL (ref 150–450)
POTASSIUM SERPL-SCNC: 4.1 MMOL/L (ref 3.5–5.3)
PROT SERPL-MCNC: 7.1 G/DL (ref 6.4–8.2)
PROT UR-ACNC: 8 MG/DL (ref 5–24)
PROT/CREAT UR: 0.09 MG/MG CREAT (ref 0–0.17)
RBC # BLD AUTO: 5.5 X10*6/UL (ref 4–5.2)
RBC #/AREA URNS AUTO: ABNORMAL /HPF
SODIUM SERPL-SCNC: 138 MMOL/L (ref 136–145)
SQUAMOUS #/AREA URNS AUTO: ABNORMAL /HPF
WBC # BLD AUTO: 8.5 X10*3/UL (ref 4.4–11.3)
WBC #/AREA URNS AUTO: ABNORMAL /HPF

## 2024-03-26 PROCEDURE — 87086 URINE CULTURE/COLONY COUNT: CPT

## 2024-03-26 PROCEDURE — 36415 COLL VENOUS BLD VENIPUNCTURE: CPT

## 2024-03-26 PROCEDURE — 86160 COMPLEMENT ANTIGEN: CPT

## 2024-03-26 PROCEDURE — 86225 DNA ANTIBODY NATIVE: CPT

## 2024-03-26 PROCEDURE — 82570 ASSAY OF URINE CREATININE: CPT

## 2024-03-26 PROCEDURE — 85025 COMPLETE CBC W/AUTO DIFF WBC: CPT

## 2024-03-26 PROCEDURE — 80053 COMPREHEN METABOLIC PANEL: CPT

## 2024-03-26 PROCEDURE — 84156 ASSAY OF PROTEIN URINE: CPT

## 2024-03-26 PROCEDURE — 85652 RBC SED RATE AUTOMATED: CPT

## 2024-03-26 PROCEDURE — 81001 URINALYSIS AUTO W/SCOPE: CPT

## 2024-03-27 LAB
BACTERIA UR CULT: NORMAL
DSDNA AB SER-ACNC: <1 IU/ML

## 2024-03-28 ENCOUNTER — OFFICE VISIT (OUTPATIENT)
Dept: PRIMARY CARE | Facility: CLINIC | Age: 29
End: 2024-03-28
Payer: COMMERCIAL

## 2024-03-28 VITALS
OXYGEN SATURATION: 98 % | BODY MASS INDEX: 32.22 KG/M2 | TEMPERATURE: 98.3 F | SYSTOLIC BLOOD PRESSURE: 117 MMHG | HEART RATE: 72 BPM | DIASTOLIC BLOOD PRESSURE: 78 MMHG | RESPIRATION RATE: 16 BRPM | WEIGHT: 165 LBS

## 2024-03-28 DIAGNOSIS — N20.0 KIDNEY STONES: Primary | ICD-10-CM

## 2024-03-28 PROCEDURE — 99213 OFFICE O/P EST LOW 20 MIN: CPT | Performed by: STUDENT IN AN ORGANIZED HEALTH CARE EDUCATION/TRAINING PROGRAM

## 2024-03-28 PROCEDURE — 3008F BODY MASS INDEX DOCD: CPT | Performed by: STUDENT IN AN ORGANIZED HEALTH CARE EDUCATION/TRAINING PROGRAM

## 2024-03-28 PROCEDURE — 1036F TOBACCO NON-USER: CPT | Performed by: STUDENT IN AN ORGANIZED HEALTH CARE EDUCATION/TRAINING PROGRAM

## 2024-03-28 ASSESSMENT — ENCOUNTER SYMPTOMS
SHORTNESS OF BREATH: 0
NAUSEA: 0
VOMITING: 0
DIZZINESS: 0
LIGHT-HEADEDNESS: 0
FEVER: 0

## 2024-03-28 NOTE — PROGRESS NOTES
"Kori Scanlon \"Bea\" is a 28 y.o. female who presents for Nephrolithiasis (Pt here today for possible kidney stones).  Patient seen today for kidney stones, patient is having dysuria, had urinary evaluation noticed 3+ oxalate crystals in urine.  No significant other findings.  Patient states she has not had any abdominal pain, low back pain, nausea, vomiting.  She states she had mild night sweats but these happen intermittently with her rheumatologic disease.  Overall doing very well.    She is taking Azo over-the-counter and that is significantly improving symptoms.        Review of Systems   Constitutional:  Negative for fever.   Respiratory:  Negative for shortness of breath.    Cardiovascular:  Negative for chest pain.   Gastrointestinal:  Negative for nausea and vomiting.   Neurological:  Negative for dizziness and light-headedness.   All other systems reviewed and are negative.      Objective   Physical Exam  Vitals reviewed.   Constitutional:       General: She is not in acute distress.     Appearance: Normal appearance. She is not toxic-appearing.   HENT:      Head: Normocephalic and atraumatic.      Nose: Nose normal.   Eyes:      Extraocular Movements: Extraocular movements intact.   Cardiovascular:      Rate and Rhythm: Normal rate and regular rhythm.      Heart sounds: No murmur heard.     No friction rub. No gallop.   Pulmonary:      Effort: Pulmonary effort is normal. No respiratory distress.      Breath sounds: Normal breath sounds. No wheezing, rhonchi or rales.   Skin:     General: Skin is warm and dry.   Neurological:      General: No focal deficit present.      Mental Status: She is alert.   Psychiatric:         Mood and Affect: Mood normal.         Behavior: Behavior normal.         Assessment/Plan   Problem List Items Addressed This Visit    None  Visit Diagnoses       Kidney stones    -  Primary          Patient seen today for calcium oxalate kidney stone evaluation.    Overall " she is doing well no signs of septic stone or obstruction.    Given ED precautions and when to follow-up there.  Patient was agreeable.    Continue Azo for symptomatic relief.  Discussed the importance of excessive hydration.  Patient was agreeable.    If symptoms persist or worsen or recurrent symptoms happen we will refer to urology.

## 2024-04-05 ENCOUNTER — PATIENT MESSAGE (OUTPATIENT)
Dept: PRIMARY CARE | Facility: CLINIC | Age: 29
End: 2024-04-05
Payer: COMMERCIAL

## 2024-04-05 DIAGNOSIS — M32.9 SYSTEMIC LUPUS ERYTHEMATOSUS, UNSPECIFIED SLE TYPE, UNSPECIFIED ORGAN INVOLVEMENT STATUS (MULTI): ICD-10-CM

## 2024-04-07 RX ORDER — ALBUTEROL SULFATE 90 UG/1
2 AEROSOL, METERED RESPIRATORY (INHALATION) EVERY 4 HOURS PRN
Qty: 18 G | Refills: 11 | Status: SHIPPED | OUTPATIENT
Start: 2024-04-07

## 2024-05-06 ENCOUNTER — APPOINTMENT (OUTPATIENT)
Dept: PRIMARY CARE | Facility: CLINIC | Age: 29
End: 2024-05-06

## 2024-05-07 DIAGNOSIS — R30.0 DYSURIA: Primary | ICD-10-CM

## 2024-05-08 ENCOUNTER — LAB (OUTPATIENT)
Dept: LAB | Facility: LAB | Age: 29
End: 2024-05-08

## 2024-05-08 DIAGNOSIS — R35.0 URINARY FREQUENCY: Primary | ICD-10-CM

## 2024-05-08 DIAGNOSIS — N30.01 ACUTE CYSTITIS WITH HEMATURIA: Primary | ICD-10-CM

## 2024-05-08 DIAGNOSIS — N30.01 ACUTE CYSTITIS WITH HEMATURIA: ICD-10-CM

## 2024-05-08 PROCEDURE — 87186 SC STD MICRODIL/AGAR DIL: CPT

## 2024-05-08 PROCEDURE — 87086 URINE CULTURE/COLONY COUNT: CPT

## 2024-05-08 RX ORDER — NITROFURANTOIN 25; 75 MG/1; MG/1
100 CAPSULE ORAL 2 TIMES DAILY
Qty: 10 CAPSULE | Refills: 0 | Status: SHIPPED | OUTPATIENT
Start: 2024-05-08 | End: 2024-05-13

## 2024-05-10 LAB — BACTERIA UR CULT: ABNORMAL

## 2024-05-28 ENCOUNTER — TELEPHONE (OUTPATIENT)
Dept: PRIMARY CARE | Facility: CLINIC | Age: 29
End: 2024-05-28

## 2024-05-29 DIAGNOSIS — R32 URINARY INCONTINENCE, UNSPECIFIED TYPE: Primary | ICD-10-CM

## 2024-05-29 RX ORDER — CEPHALEXIN 500 MG/1
500 CAPSULE ORAL 2 TIMES DAILY
Qty: 14 CAPSULE | Refills: 0 | Status: SHIPPED | OUTPATIENT
Start: 2024-05-29 | End: 2024-06-05

## 2024-06-11 DIAGNOSIS — M32.9 SYSTEMIC LUPUS ERYTHEMATOSUS, UNSPECIFIED SLE TYPE, UNSPECIFIED ORGAN INVOLVEMENT STATUS (MULTI): ICD-10-CM

## 2024-06-11 RX ORDER — BELIMUMAB 200 MG/ML
200 SOLUTION SUBCUTANEOUS
Qty: 12 ML | Refills: 3 | Status: SHIPPED | OUTPATIENT
Start: 2024-06-11

## 2024-07-01 ENCOUNTER — APPOINTMENT (OUTPATIENT)
Dept: RHEUMATOLOGY | Facility: CLINIC | Age: 29
End: 2024-07-01

## 2024-07-31 ENCOUNTER — SPECIALTY PHARMACY (OUTPATIENT)
Dept: PHARMACY | Facility: CLINIC | Age: 29
End: 2024-07-31

## 2024-08-08 ENCOUNTER — APPOINTMENT (OUTPATIENT)
Dept: PRIMARY CARE | Facility: CLINIC | Age: 29
End: 2024-08-08
Payer: COMMERCIAL

## 2024-08-08 VITALS
HEART RATE: 99 BPM | TEMPERATURE: 98.2 F | RESPIRATION RATE: 18 BRPM | WEIGHT: 160.5 LBS | SYSTOLIC BLOOD PRESSURE: 117 MMHG | OXYGEN SATURATION: 95 % | DIASTOLIC BLOOD PRESSURE: 77 MMHG | BODY MASS INDEX: 31.35 KG/M2

## 2024-08-08 DIAGNOSIS — Z01.818 TUBAL LIGATION EVALUATION: Primary | ICD-10-CM

## 2024-08-08 PROCEDURE — 99214 OFFICE O/P EST MOD 30 MIN: CPT | Performed by: STUDENT IN AN ORGANIZED HEALTH CARE EDUCATION/TRAINING PROGRAM

## 2024-08-08 PROCEDURE — 1036F TOBACCO NON-USER: CPT | Performed by: STUDENT IN AN ORGANIZED HEALTH CARE EDUCATION/TRAINING PROGRAM

## 2024-08-08 PROCEDURE — G2211 COMPLEX E/M VISIT ADD ON: HCPCS | Performed by: STUDENT IN AN ORGANIZED HEALTH CARE EDUCATION/TRAINING PROGRAM

## 2024-08-08 ASSESSMENT — ENCOUNTER SYMPTOMS
SHORTNESS OF BREATH: 0
FEVER: 0
LIGHT-HEADEDNESS: 0
VOMITING: 0
DIZZINESS: 0
NAUSEA: 0

## 2024-08-13 ENCOUNTER — APPOINTMENT (OUTPATIENT)
Dept: OBSTETRICS AND GYNECOLOGY | Facility: CLINIC | Age: 29
End: 2024-08-13
Payer: COMMERCIAL

## 2024-08-20 PROCEDURE — RXMED WILLOW AMBULATORY MEDICATION CHARGE

## 2024-08-23 ENCOUNTER — PHARMACY VISIT (OUTPATIENT)
Dept: PHARMACY | Facility: CLINIC | Age: 29
End: 2024-08-23
Payer: COMMERCIAL

## 2024-08-26 DIAGNOSIS — M32.9 SYSTEMIC LUPUS ERYTHEMATOSUS, UNSPECIFIED SLE TYPE, UNSPECIFIED ORGAN INVOLVEMENT STATUS (MULTI): ICD-10-CM

## 2024-08-26 DIAGNOSIS — M32.9 SYSTEMIC LUPUS ERYTHEMATOSUS, UNSPECIFIED SLE TYPE, UNSPECIFIED ORGAN INVOLVEMENT STATUS (MULTI): Primary | ICD-10-CM

## 2024-08-26 RX ORDER — HYDROXYCHLOROQUINE SULFATE 200 MG/1
200 TABLET, FILM COATED ORAL DAILY
Qty: 30 TABLET | Refills: 5 | Status: SHIPPED | OUTPATIENT
Start: 2024-08-26

## 2024-08-27 ENCOUNTER — TELEMEDICINE CLINICAL SUPPORT (OUTPATIENT)
Dept: PHARMACY | Facility: HOSPITAL | Age: 29
End: 2024-08-27
Payer: COMMERCIAL

## 2024-08-27 DIAGNOSIS — M32.9 SYSTEMIC LUPUS ERYTHEMATOSUS, UNSPECIFIED SLE TYPE, UNSPECIFIED ORGAN INVOLVEMENT STATUS (MULTI): ICD-10-CM

## 2024-08-27 RX ORDER — BELIMUMAB 200 MG/ML
200 SOLUTION SUBCUTANEOUS
Qty: 12 ML | Refills: 3 | Status: SHIPPED | OUTPATIENT
Start: 2024-08-27

## 2024-08-27 NOTE — PROGRESS NOTES
"[facility] Clinical Note    Leah Scanlon \"Bea\" is a 29 y.o. female, who is on the specialty pharmacy service for management of:  Rheumatology Core.    Leah Scanlon \"Bea\" is taking: Benlysta 200mg/mL.    Medication Receipt Date: 8/27/24  Medication Start Date (planned or actual): 8/27/24    Leah was contacted on 8/27/2024 at 3:48 PM for a virtual pharmacy visit with encounter number [id number] from:   [facility]  [address]  [address]  [facility] [address]  Dept: [phone]  Dept Fax: [phone]  Loc: [phone]    Leah was offered a Telemedicine Video visit or Telephone visit.  Leah consented to a telephone visit, which was performed.    The most recent encounter visit with the referring prescriber Dr. Montanez on 7/1/24 (Date) was reviewed.  Pharmacy will continue to collaborate in the care of this patient with the referring prescriber Dr. Montanez.    General Assessment      Impression/Plan  IMPRESSION/PLAN:  Is patient high risk (potential patients:  pregnancy, geriatric, pediatric)?  no  Is laboratory follow-up needed? no  Is a clinical intervention needed? no  Next reassessment date? Approx every 6 months  Additional comments: patient resuming Benlysta after ~1 year off of therapy. She was previously on this for 6 years and did well. No issues with the medication. Discussed all counseling points, discussed flu shot/vaccines and answered all pt questions. Pt appreciated the call and info provided. Will send updated rx through to [facility] following telepharmacy visit.     Refer to the encounter summary report for documentation details about patient counseling and education.      Medication Adherence    The importance of adherence was discussed with the patient and they were advised to take the medication as prescribed by their provider. Patient was encouraged to call their physician's " office if they have a question regarding a missed dose.     QOL/Patient Satisfaction  Rate your quality of life on scale of 1-10: 6  Rate your satisfaction with  Specialty Pharmacy on scale of 1-10: 6 (unable to find insurance for ~2 months but pt appreciates that [facility] has been able to fill her rx for her now)      Patient was advised to contact the pharmacy if there are any changes to their medication list, including prescriptions, OTC medications, herbal products, or supplements. Patient was advised of [facility]'s dispensing process, refill timeline, contact information ([phone]), and patient management follow up. Patient confirmed understanding of education conducted during assessment. All patient questions and concerns were addressed to the best of my ability. Patient was encouraged to contact the specialty pharmacy with any questions or concerns.    Confirmed follow-up outreaches are properly scheduled and reviewed goals of therapy with the patient.        Delmi Monroy, LanieD

## 2024-09-12 ENCOUNTER — SPECIALTY PHARMACY (OUTPATIENT)
Dept: PHARMACY | Facility: CLINIC | Age: 29
End: 2024-09-12

## 2024-09-13 PROCEDURE — RXMED WILLOW AMBULATORY MEDICATION CHARGE

## 2024-09-14 ENCOUNTER — PHARMACY VISIT (OUTPATIENT)
Dept: PHARMACY | Facility: CLINIC | Age: 29
End: 2024-09-14
Payer: COMMERCIAL

## 2024-10-09 ENCOUNTER — SPECIALTY PHARMACY (OUTPATIENT)
Dept: PHARMACY | Facility: CLINIC | Age: 29
End: 2024-10-09

## 2024-10-09 PROCEDURE — RXMED WILLOW AMBULATORY MEDICATION CHARGE

## 2024-10-11 ENCOUNTER — PHARMACY VISIT (OUTPATIENT)
Dept: PHARMACY | Facility: CLINIC | Age: 29
End: 2024-10-11
Payer: COMMERCIAL

## 2024-10-21 DIAGNOSIS — F32.1 CURRENT MODERATE EPISODE OF MAJOR DEPRESSIVE DISORDER, UNSPECIFIED WHETHER RECURRENT (MULTI): ICD-10-CM

## 2024-10-21 RX ORDER — BUPROPION HYDROCHLORIDE 300 MG/1
300 TABLET ORAL DAILY
Qty: 90 TABLET | Refills: 1 | Status: SHIPPED | OUTPATIENT
Start: 2024-10-21 | End: 2025-04-19

## 2024-10-24 ENCOUNTER — OFFICE VISIT (OUTPATIENT)
Dept: OBSTETRICS AND GYNECOLOGY | Facility: CLINIC | Age: 29
End: 2024-10-24
Payer: COMMERCIAL

## 2024-10-24 ENCOUNTER — PREP FOR PROCEDURE (OUTPATIENT)
Dept: OBSTETRICS AND GYNECOLOGY | Facility: HOSPITAL | Age: 29
End: 2024-10-24

## 2024-10-24 ENCOUNTER — HOSPITAL ENCOUNTER (OUTPATIENT)
Dept: RADIOLOGY | Facility: CLINIC | Age: 29
Discharge: HOME | End: 2024-10-24
Payer: COMMERCIAL

## 2024-10-24 VITALS
HEIGHT: 60 IN | WEIGHT: 159.4 LBS | SYSTOLIC BLOOD PRESSURE: 132 MMHG | BODY MASS INDEX: 31.29 KG/M2 | DIASTOLIC BLOOD PRESSURE: 87 MMHG

## 2024-10-24 DIAGNOSIS — Z30.09 CONSULTATION FOR FEMALE STERILIZATION: Primary | ICD-10-CM

## 2024-10-24 DIAGNOSIS — Z01.818 TUBAL LIGATION EVALUATION: ICD-10-CM

## 2024-10-24 DIAGNOSIS — M25.552 LEFT HIP PAIN: ICD-10-CM

## 2024-10-24 PROCEDURE — 3008F BODY MASS INDEX DOCD: CPT | Performed by: OBSTETRICS & GYNECOLOGY

## 2024-10-24 PROCEDURE — 99213 OFFICE O/P EST LOW 20 MIN: CPT | Performed by: OBSTETRICS & GYNECOLOGY

## 2024-10-24 PROCEDURE — 73502 X-RAY EXAM HIP UNI 2-3 VIEWS: CPT | Mod: LT

## 2024-10-24 RX ORDER — CELECOXIB 200 MG/1
400 CAPSULE ORAL ONCE
OUTPATIENT
Start: 2024-10-24 | End: 2024-10-24

## 2024-10-24 RX ORDER — ACETAMINOPHEN 325 MG/1
975 TABLET ORAL ONCE
OUTPATIENT
Start: 2024-10-24 | End: 2024-10-24

## 2024-10-24 RX ORDER — GABAPENTIN 600 MG/1
600 TABLET ORAL ONCE
OUTPATIENT
Start: 2024-10-24 | End: 2024-10-24

## 2024-10-24 NOTE — PROGRESS NOTES
"Subjective   Patient ID: Leah Scanlon \"Bea\" is a 29 y.o. female who presents for No chief complaint on file..  HPI  Pt is here to discuss tubal ligation.  She is a G0.  Pt has Lupus and she doesn't want to pass it on or go through pregnancy with the dx.  She has lupus flares all the time that are very bad and can last months.  Has had some problems with birth control pills and depo shot in past with bad side effects.  Has decided if wants children, she would like to adopt, since she is adopted herself.    Review of Systems  all other symptoms were found to be neg except for HPI/CC.      Objective   Physical Exam  General  General Appearance - normal build and Well groomed, Not in acute distress, No acute respiratory distress.  Mental Status - Alert.    Integumentary  - - warm and dry with no rashes.    Head and Neck  - - normalocephalic.    Eye  - - Bilateral - pupils equal and round and sclera clear.    Chest and Lung Exam  - - Bilateral - normal breathing effort.    Musculoskeletal  - - normal posture and normal gait and station.      Assessment/Plan   Diagnoses and all orders for this visit:  Tubal ligation evaluation  -     Referral to Obstetrics / Gynecology     Explained risks, benefits, alternatives, failure rates and regret to Pt and she would like a tubal.  Will schedule a lap B/L salpingectomy.    Pt to F/U 2wks PO  Pt is to call with any questions of concerns.     Lulu Bush, DO 10/24/24 11:19 AM     "

## 2024-10-30 DIAGNOSIS — S16.1XXA STRAIN OF NECK MUSCLE, INITIAL ENCOUNTER: Primary | ICD-10-CM

## 2024-11-01 ENCOUNTER — TELEPHONE (OUTPATIENT)
Dept: PRIMARY CARE | Facility: CLINIC | Age: 29
End: 2024-11-01
Payer: COMMERCIAL

## 2024-11-06 ENCOUNTER — SPECIALTY PHARMACY (OUTPATIENT)
Dept: PHARMACY | Facility: CLINIC | Age: 29
End: 2024-11-06

## 2024-11-06 PROCEDURE — RXMED WILLOW AMBULATORY MEDICATION CHARGE

## 2024-11-13 ENCOUNTER — APPOINTMENT (OUTPATIENT)
Dept: GASTROENTEROLOGY | Facility: CLINIC | Age: 29
End: 2024-11-13
Payer: COMMERCIAL

## 2024-11-13 VITALS
WEIGHT: 158 LBS | SYSTOLIC BLOOD PRESSURE: 120 MMHG | HEART RATE: 87 BPM | DIASTOLIC BLOOD PRESSURE: 84 MMHG | HEIGHT: 60 IN | BODY MASS INDEX: 31.02 KG/M2 | OXYGEN SATURATION: 97 %

## 2024-11-13 DIAGNOSIS — K30 INDIGESTION: Primary | ICD-10-CM

## 2024-11-13 DIAGNOSIS — R10.9 ABDOMINAL DISCOMFORT: ICD-10-CM

## 2024-11-13 PROCEDURE — 99213 OFFICE O/P EST LOW 20 MIN: CPT | Performed by: STUDENT IN AN ORGANIZED HEALTH CARE EDUCATION/TRAINING PROGRAM

## 2024-11-13 PROCEDURE — 3008F BODY MASS INDEX DOCD: CPT | Performed by: STUDENT IN AN ORGANIZED HEALTH CARE EDUCATION/TRAINING PROGRAM

## 2024-11-13 PROCEDURE — 1036F TOBACCO NON-USER: CPT | Performed by: STUDENT IN AN ORGANIZED HEALTH CARE EDUCATION/TRAINING PROGRAM

## 2024-11-13 NOTE — PROGRESS NOTES
"CC: Follow-up.    History of Present Illness:   Leah Scanlon \"Bea\" is a 29 y.o. female with a PMH of obesity, fibromyalgia, migraines, SLE (on Plaquenil), depression, ADHD, GERD who presents to clinic for follow-up of abdominal discomfort and indigestion. Patient has lost 40 pounds over the last 10 months. No pop.  Has worked on and stopped prior substance abuse (marijuana/alcohol use), last used in Feb/March/2024. Working on stress with stress relieving techniques. GI symptoms are better with all these lifestyle changes. Occasional abdominal discomfort and indigestion.  Occasional nausea.  Zofran causes bad headaches.  Curious about need for EGD.  Not taking any medications.    EGD 2020: Gastritis (biopsy-proven), Bravo placed (WNL).  Normal small bowel biopsies.  Colonoscopy 2020: WNL.  Normal random biopsies.    GI visit 12/2023: \" presents to clinic with abdominal pain, GERD and atypical chest pain. The atypical chest pain episodes occur a few times per week. She describes it as sharp.  Is located in the left chest.  Cardiac workup has been normal.  She was told that maybe it is GI in nature.  She has had significant GI workup in the past by multiple providers.  Last by Dr. Gupta in 2020.  Scopes as per below.  She is on Nexium, but does not take it regularly.  She takes it at bedtime.  She does not drink pop or alcohol.  Has cut out dairy products.  No issues with her bowels. Stress level are high.  Does not sleep well.  Patient recently started working with a therapist more.  Patient states that she gets some exercise (walking).\"    Review of Systems  ROS Negative unless otherwise stated above.    Past Medical/Surgical History  Past Medical History:   Diagnosis Date    Anesthesia of skin 10/16/2017    Numbness in feet    Encounter for contraceptive management, unspecified 09/10/2020    Contraception management    Encounter for gynecological examination (general) (routine) without abnormal findings " 12/17/2021    Well woman exam    Gastro-esophageal reflux disease without esophagitis 02/04/2022    GERD without esophagitis    Migraine, unspecified, not intractable, without status migrainosus 10/16/2017    Migraine    Other specified cough 08/12/2019    Productive cough    Other specified symptoms and signs involving the digestive system and abdomen 06/10/2020    Irregular bowel habits    Personal history of diseases of the skin and subcutaneous tissue 09/10/2020    History of acne    Personal history of other diseases of the digestive system 06/10/2020    History of rectal bleeding    Personal history of other diseases of the digestive system 06/10/2020    History of gastroesophageal reflux (GERD)    Personal history of other diseases of the digestive system 04/27/2018    History of dental abscess    Personal history of other diseases of the nervous system and sense organs     History of itching of eye    Personal history of other mental and behavioral disorders 10/16/2017    History of depression    Personal history of other specified conditions 08/01/2022    History of urinary incontinence    Systemic involvement of connective tissue, unspecified (Multi) 06/06/2018    Undifferentiated connective tissue disease      Past Surgical History:   Procedure Laterality Date    EYE SURGERY  10/16/2017    Eye Surgery    FOOT SURGERY  10/16/2017    Foot Surgery    TONSILLECTOMY  10/16/2017    Tonsillectomy With Adenoidectomy        Social History   reports that she has never smoked. She has never used smokeless tobacco. She reports that she does not drink alcohol and does not use drugs.     Family History  family history includes No Known Problems in her father and mother.     Allergies  Allergies   Allergen Reactions    Dog Dander Anaphylaxis    Bee Pollen Unknown    Cat Dander Unknown    Other Unknown    Ragweed Unknown       Medications  Current Outpatient Medications   Medication Instructions    albuterol 90  "mcg/actuation inhaler 2 puffs, inhalation, Every 4 hours PRN    belimumab (Benlysta) 200 mg/mL injection Inject 200 mg (1 pen) under the skin 1 (one) time per week.    buPROPion XL (WELLBUTRIN XL) 300 mg, oral, Daily    CALCIUM ORAL 50 mg, Daily RT    cholecalciferol (VITAMIN D-3) 25 mcg, Daily    hydroxychloroquine (PLAQUENIL) 200 mg, oral, Daily    MAGNESIUM CARBONATE ORAL Daily RT    multivitamin capsule 1 capsule, Daily RT    PreviDent 5000 Booster Plus 1.1 % dental paste BRUSH ON TEETH AT LEAST ONCE PER DAY FOR 2 MINUTES. SPIT OUT AFTER. DO NOT RINSE        Objective   Visit Vitals  /84   Pulse 87        General: A&Ox3, NAD.  HEENT: AT/NC.   GI: Soft, NT/ND.   Extrem: No edema.   Skin: No Jaundice.   Neuro: No focal deficits.   Psych: Normal mood and affect.     Lab Results   Component Value Date    WBC 8.5 03/26/2024    HGB 14.0 03/26/2024    HCT 43.3 03/26/2024    MCV 79 (L) 03/26/2024     03/26/2024       Chemistry    Lab Results   Component Value Date/Time     03/26/2024 1041    K 4.1 03/26/2024 1041     03/26/2024 1041    CO2 27 03/26/2024 1041    BUN 13 03/26/2024 1041    CREATININE 0.64 03/26/2024 1041    Lab Results   Component Value Date/Time    CALCIUM 9.2 03/26/2024 1041    ALKPHOS 56 03/26/2024 1041    AST 13 03/26/2024 1041    ALT 11 03/26/2024 1041    BILITOT 0.6 03/26/2024 1041             ASSESSMENT/PLAN  Leah Scanlon \"Bea\" is a 29 y.o. female with a PMH of obesity, fibromyalgia, migraines, SLE (on Plaquenil), depression, ADHD, GERD who presents to clinic for follow-up of abdominal discomfort and indigestion.  Patient is doing pretty well, occasional symptoms.  Not on any GI medications.  Of note, she is on Plaquenil.     EGD 2020: Gastritis (biopsy-proven), Bravo placed (WNL).  Normal small bowel biopsies.  Colonoscopy 2020: WNL.  Normal random biopsies.    -Start Pepcid as needed.  -Antireflux lifestyle.  -Avoid NSAIDs.  -Discussed the importance of stress " reduction, good dietary habits.      William Tafoya, DO

## 2024-11-14 ENCOUNTER — PHARMACY VISIT (OUTPATIENT)
Dept: PHARMACY | Facility: CLINIC | Age: 29
End: 2024-11-14
Payer: COMMERCIAL

## 2024-11-19 ENCOUNTER — TELEPHONE (OUTPATIENT)
Dept: OPERATING ROOM | Facility: HOSPITAL | Age: 29
End: 2024-11-19
Payer: COMMERCIAL

## 2024-11-19 NOTE — TELEPHONE ENCOUNTER
Received message from Dr. Bush on 10/29 to schedule a tubal at [facility].    I have left messages for patient on 10/29 @ 12:49pm, 11/14 @ 11:45am, & 11/15 @ 1:32pm.    Patient has not returned the phone call.  Will give couple of more days to see if patient calls back to schedule.   I have one spot left in January to schedule a tubal.

## 2024-11-25 ENCOUNTER — PATIENT MESSAGE (OUTPATIENT)
Dept: PRIMARY CARE | Facility: CLINIC | Age: 29
End: 2024-11-25
Payer: COMMERCIAL

## 2024-11-27 ENCOUNTER — LAB (OUTPATIENT)
Dept: LAB | Facility: LAB | Age: 29
End: 2024-11-27
Payer: COMMERCIAL

## 2024-11-27 DIAGNOSIS — M32.9 SYSTEMIC LUPUS ERYTHEMATOSUS, UNSPECIFIED SLE TYPE, UNSPECIFIED ORGAN INVOLVEMENT STATUS (MULTI): ICD-10-CM

## 2024-11-27 LAB
ANION GAP SERPL CALC-SCNC: 12 MMOL/L (ref 10–20)
BASOPHILS # BLD AUTO: 0.03 X10*3/UL (ref 0–0.1)
BASOPHILS NFR BLD AUTO: 0.4 %
BUN SERPL-MCNC: 14 MG/DL (ref 6–23)
CALCIUM SERPL-MCNC: 9.4 MG/DL (ref 8.6–10.6)
CHLORIDE SERPL-SCNC: 103 MMOL/L (ref 98–107)
CO2 SERPL-SCNC: 28 MMOL/L (ref 21–32)
CREAT SERPL-MCNC: 0.78 MG/DL (ref 0.5–1.05)
CREAT UR-MCNC: 45.3 MG/DL (ref 20–320)
EGFRCR SERPLBLD CKD-EPI 2021: >90 ML/MIN/1.73M*2
EOSINOPHIL # BLD AUTO: 0.08 X10*3/UL (ref 0–0.7)
EOSINOPHIL NFR BLD AUTO: 1 %
ERYTHROCYTE [DISTWIDTH] IN BLOOD BY AUTOMATED COUNT: 13.1 % (ref 11.5–14.5)
ERYTHROCYTE [SEDIMENTATION RATE] IN BLOOD BY WESTERGREN METHOD: 4 MM/H (ref 0–20)
GLUCOSE SERPL-MCNC: 89 MG/DL (ref 74–99)
HCT VFR BLD AUTO: 42.8 % (ref 36–46)
HGB BLD-MCNC: 13.7 G/DL (ref 12–16)
IMM GRANULOCYTES # BLD AUTO: 0.02 X10*3/UL (ref 0–0.7)
IMM GRANULOCYTES NFR BLD AUTO: 0.2 % (ref 0–0.9)
LYMPHOCYTES # BLD AUTO: 2.61 X10*3/UL (ref 1.2–4.8)
LYMPHOCYTES NFR BLD AUTO: 32 %
MCH RBC QN AUTO: 25.3 PG (ref 26–34)
MCHC RBC AUTO-ENTMCNC: 32 G/DL (ref 32–36)
MCV RBC AUTO: 79 FL (ref 80–100)
MONOCYTES # BLD AUTO: 1.05 X10*3/UL (ref 0.1–1)
MONOCYTES NFR BLD AUTO: 12.9 %
NEUTROPHILS # BLD AUTO: 4.36 X10*3/UL (ref 1.2–7.7)
NEUTROPHILS NFR BLD AUTO: 53.5 %
NRBC BLD-RTO: 0 /100 WBCS (ref 0–0)
PLATELET # BLD AUTO: 260 X10*3/UL (ref 150–450)
POTASSIUM SERPL-SCNC: 3.9 MMOL/L (ref 3.5–5.3)
PROT UR-ACNC: 4 MG/DL (ref 5–24)
PROT/CREAT UR: 0.09 MG/MG CREAT (ref 0–0.17)
RBC # BLD AUTO: 5.41 X10*6/UL (ref 4–5.2)
SODIUM SERPL-SCNC: 139 MMOL/L (ref 136–145)
WBC # BLD AUTO: 8.2 X10*3/UL (ref 4.4–11.3)

## 2024-11-27 PROCEDURE — 36415 COLL VENOUS BLD VENIPUNCTURE: CPT

## 2024-11-27 PROCEDURE — 80048 BASIC METABOLIC PNL TOTAL CA: CPT

## 2024-11-27 PROCEDURE — 85652 RBC SED RATE AUTOMATED: CPT

## 2024-11-27 PROCEDURE — 85025 COMPLETE CBC W/AUTO DIFF WBC: CPT

## 2024-11-27 PROCEDURE — 82570 ASSAY OF URINE CREATININE: CPT

## 2024-11-27 PROCEDURE — 84156 ASSAY OF PROTEIN URINE: CPT

## 2024-12-02 ENCOUNTER — APPOINTMENT (OUTPATIENT)
Dept: RHEUMATOLOGY | Facility: CLINIC | Age: 29
End: 2024-12-02
Payer: COMMERCIAL

## 2024-12-05 ENCOUNTER — TELEPHONE (OUTPATIENT)
Dept: OPERATING ROOM | Facility: HOSPITAL | Age: 29
End: 2024-12-05
Payer: COMMERCIAL

## 2024-12-10 PROCEDURE — RXMED WILLOW AMBULATORY MEDICATION CHARGE

## 2024-12-13 ENCOUNTER — SPECIALTY PHARMACY (OUTPATIENT)
Dept: PHARMACY | Facility: CLINIC | Age: 29
End: 2024-12-13

## 2024-12-18 ENCOUNTER — PHARMACY VISIT (OUTPATIENT)
Dept: PHARMACY | Facility: CLINIC | Age: 29
End: 2024-12-18
Payer: COMMERCIAL

## 2024-12-31 ENCOUNTER — TELEMEDICINE (OUTPATIENT)
Dept: PRIMARY CARE | Facility: CLINIC | Age: 29
End: 2024-12-31
Payer: COMMERCIAL

## 2024-12-31 VITALS — WEIGHT: 158 LBS | BODY MASS INDEX: 30.86 KG/M2

## 2024-12-31 DIAGNOSIS — J01.00 ACUTE NON-RECURRENT MAXILLARY SINUSITIS: Primary | ICD-10-CM

## 2024-12-31 PROCEDURE — 1036F TOBACCO NON-USER: CPT | Performed by: STUDENT IN AN ORGANIZED HEALTH CARE EDUCATION/TRAINING PROGRAM

## 2024-12-31 PROCEDURE — 99213 OFFICE O/P EST LOW 20 MIN: CPT | Performed by: STUDENT IN AN ORGANIZED HEALTH CARE EDUCATION/TRAINING PROGRAM

## 2024-12-31 RX ORDER — AMOXICILLIN AND CLAVULANATE POTASSIUM 875; 125 MG/1; MG/1
1 TABLET, FILM COATED ORAL 2 TIMES DAILY
Qty: 14 TABLET | Refills: 0 | Status: SHIPPED | OUTPATIENT
Start: 2024-12-31 | End: 2025-01-07

## 2024-12-31 ASSESSMENT — ENCOUNTER SYMPTOMS
COUGH: 1
ABDOMINAL PAIN: 0
HEADACHES: 1
SINUS PAIN: 1

## 2024-12-31 NOTE — PROGRESS NOTES
"Kori Scanlon \"Bea\" is a 29 y.o. female who presents for URI (Pt to do virtual visit today for cold symptoms. Pt stated that she's had for about a month.).  URI   This is a recurrent problem. The current episode started more than 1 month ago. The problem has been unchanged. There has been no fever. Associated symptoms include congestion, coughing, ear pain, headaches, sinus pain and sneezing. Pertinent negatives include no abdominal pain or chest pain.   She has tried over-the-counter ibuprofen, Tylenol regularly, some relief with these.  No significant sick contacts, no positive flu or COVID test.      Review of Systems   HENT:  Positive for congestion, ear pain, sinus pain and sneezing.    Respiratory:  Positive for cough.    Cardiovascular:  Negative for chest pain.   Gastrointestinal:  Negative for abdominal pain.   Neurological:  Positive for headaches.       Objective   Physical Exam  Constitutional:       Comments: Seen via virtual exam         Assessment/Plan   Problem List Items Addressed This Visit    None  Visit Diagnoses       Acute non-recurrent maxillary sinusitis    -  Primary    Relevant Medications    amoxicillin-pot clavulanate (Augmentin) 875-125 mg tablet        Patient seen today for sinusitis, we will treat with Augmentin.    No additional evaluation necessary, follow-up as new concerns arise.       "

## 2025-01-06 DIAGNOSIS — J01.00 ACUTE NON-RECURRENT MAXILLARY SINUSITIS: Primary | ICD-10-CM

## 2025-01-06 RX ORDER — AZITHROMYCIN 250 MG/1
TABLET, FILM COATED ORAL
Qty: 6 TABLET | Refills: 0 | Status: SHIPPED | OUTPATIENT
Start: 2025-01-06 | End: 2025-01-11

## 2025-01-08 ENCOUNTER — SPECIALTY PHARMACY (OUTPATIENT)
Dept: PHARMACY | Facility: CLINIC | Age: 30
End: 2025-01-08

## 2025-01-08 PROCEDURE — RXMED WILLOW AMBULATORY MEDICATION CHARGE

## 2025-01-10 ENCOUNTER — APPOINTMENT (OUTPATIENT)
Dept: OBSTETRICS AND GYNECOLOGY | Facility: CLINIC | Age: 30
End: 2025-01-10
Payer: COMMERCIAL

## 2025-01-13 ENCOUNTER — PHARMACY VISIT (OUTPATIENT)
Dept: PHARMACY | Facility: CLINIC | Age: 30
End: 2025-01-13
Payer: COMMERCIAL

## 2025-01-14 DIAGNOSIS — B37.9 YEAST INFECTION: Primary | ICD-10-CM

## 2025-01-14 RX ORDER — FLUCONAZOLE 150 MG/1
150 TABLET ORAL
Qty: 2 TABLET | Refills: 0 | Status: SHIPPED | OUTPATIENT
Start: 2025-01-14

## 2025-02-04 PROCEDURE — RXMED WILLOW AMBULATORY MEDICATION CHARGE

## 2025-02-07 ENCOUNTER — SPECIALTY PHARMACY (OUTPATIENT)
Dept: PHARMACY | Facility: CLINIC | Age: 30
End: 2025-02-07

## 2025-02-13 ENCOUNTER — PHARMACY VISIT (OUTPATIENT)
Dept: PHARMACY | Facility: CLINIC | Age: 30
End: 2025-02-13
Payer: COMMERCIAL

## 2025-02-28 ENCOUNTER — SPECIALTY PHARMACY (OUTPATIENT)
Dept: PHARMACY | Facility: CLINIC | Age: 30
End: 2025-02-28

## 2025-02-28 NOTE — PROGRESS NOTES
"[facility] Clinical Note  Patient Reassessment     Introduction  Leah Scanlon \"Bea\" is a 29 y.o. female who is on the specialty pharmacy service for management of: Rheumatology Core.      [facility] supplied medication: belimumab (Benlysta) 200 mg/mL injection     Duration of therapy: Maintenance    The most recent encounter visit with the referring prescriber Stephanie Montanez MD  on 2/19/2024 was reviewed.  Pharmacy will continue to collaborate in the care of this patient with the referring prescriber.    Discussion  Leah was contacted on 2/28/2025 at 10:27 AM for a pharmacy visit with encounter number [id number] from:   [facility]  [address] [place] [id number]  Dept: [phone]  Dept Fax: [phone]  Leah consented to a/an Telephone visit, which was performed.    Efficacy  Patient has developed new symptoms of condition: No  Patient/caregiver feels medication is affecting the disease state: Patient feels medication \"works perfectly\" She has been on it several years and believes it greatly helps her disease state.     Goals  Provided education on goals and possible outcomes of therapy:  Adherence with therapy  Timely completion of appropriate labs  Timely and appropriate follow up with provider  Identify and address medication interactions with presciption medications, OTC medications and supplements  Optimize or maintain quality of life  Patient has documented target(s) for goals of therapy: Yes    Tolerance  Patient has experienced side effects from this medication: No- She notices mild redness and bruising at injection site that goes away in a short period of time.  Changes to current therapy regimen: No    The follow-up timeline was discussed. Every person responds to and reacts to therapy differently. Patient should be assessed for efficacy and tolerability in approximately: 6 months    Adherence  Patient " Information  Informant: Self (Patient)  Demonstrates Understanding of Importance of Adherence: Yes  Does the patient have any barriers to self-administration (including physical and mental?): No  Medication Information  Medication: belimumab (Benlysta)  Patient Reported Missed Doses in the Last 4 Weeks: 0  Estimated Medication Adherence Level: Good  Adherence Estimation Source: Claims history  Barriers to Adherence: No Problems identified   The importance of adherence was discussed and patient/caregiver was advised to take the medication as prescribed by their provider. Encouraged patient/caregiver to call physician's office or specialty pharmacy if they have a question regarding a missed dose.    General Assessment  Changes to home medications, OTCs or supplements: No  Current Outpatient Medications   Medication Sig Dispense Refill    albuterol 90 mcg/actuation inhaler Inhale 2 puffs every 4 hours if needed for shortness of breath. 18 g 11    belimumab (Benlysta) 200 mg/mL injection Inject 200 mg (1 pen) under the skin 1 (one) time per week. 12 mL 3    buPROPion XL (Wellbutrin XL) 300 mg 24 hr tablet Take 1 tablet (300 mg) by mouth once daily. 90 tablet 1    CALCIUM ORAL Take 50 mg by mouth once daily.      cholecalciferol (Vitamin D-3) 25 MCG (1000 UT) capsule Take 1 capsule (25 mcg) by mouth once daily.      fluconazole (Diflucan) 150 mg tablet Take 1 tablet (150 mg) by mouth every 3rd day if needed (yeast infection). 2 tablet 0    hydroxychloroquine (Plaquenil) 200 mg tablet Take 1 tablet (200 mg) by mouth once daily. 30 tablet 5    MAGNESIUM CARBONATE ORAL Take by mouth once daily.      multivitamin capsule Take 1 capsule by mouth once daily.      PreviDent 5000 Booster Plus 1.1 % dental paste BRUSH ON TEETH AT LEAST ONCE PER DAY FOR 2 MINUTES. SPIT OUT AFTER. DO NOT RINSE       No current facility-administered medications for this visit.     Reported new allergies: No  Reported new medical conditions:  No  Additional monitoring reviewed: Rheumatology- CBC-diff:   Lab Results   Component Value Date    WBC 8.2 11/27/2024    RBC 5.41 (H) 11/27/2024    HGB 13.7 11/27/2024    HCT 42.8 11/27/2024    MCV 79 (L) 11/27/2024    MCHC 32.0 11/27/2024     11/27/2024    RDW 13.1 11/27/2024    NEUTOPHILPCT 53.5 11/27/2024    IGPCT 0.2 11/27/2024    LYMPHOPCT 32.0 11/27/2024    MONOPCT 12.9 11/27/2024    EOSPCT 1.0 11/27/2024    BASOPCT 0.4 11/27/2024    NEUTROABS 4.36 11/27/2024    LYMPHSABS 2.61 11/27/2024    MONOSABS 1.05 (H) 11/27/2024    EOSABS 0.08 11/27/2024    BASOSABS 0.03 11/27/2024     Is laboratory follow up needed? No    Advised to contact the pharmacy if there are any changes to the patient's medication list, including prescriptions, OTC medications, herbal products, or supplements.    Impression/Plan  This patient has not been identified as high risk due to Lack of high risk qualifiers.  The following action was taken: N/A.         Provided contact information ([phone]) for [facility] and reviewed dispensing process, refill timeline and patient management follow up. Confirmed understanding of education conducted during assessment. All questions and concerns were addressed and patient/caregiver was encouraged to reach out for additional questions or concerns.    Based on the patient's diagnosis, medication list, progress towards goals, adherence, tolerance, and medication list, medication remains appropriate: Therapy remains appropriate (I attest)    DAVIDE ALMONTE

## 2025-03-03 DIAGNOSIS — M32.9 SYSTEMIC LUPUS ERYTHEMATOSUS, UNSPECIFIED SLE TYPE, UNSPECIFIED ORGAN INVOLVEMENT STATUS (MULTI): Primary | ICD-10-CM

## 2025-03-03 RX ORDER — HYDROXYCHLOROQUINE SULFATE 200 MG/1
200 TABLET, FILM COATED ORAL DAILY
Qty: 30 TABLET | Refills: 3 | Status: SHIPPED | OUTPATIENT
Start: 2025-03-03

## 2025-03-06 ENCOUNTER — SPECIALTY PHARMACY (OUTPATIENT)
Dept: PHARMACY | Facility: CLINIC | Age: 30
End: 2025-03-06

## 2025-03-06 ENCOUNTER — APPOINTMENT (OUTPATIENT)
Dept: OPHTHALMOLOGY | Facility: CLINIC | Age: 30
End: 2025-03-06
Payer: COMMERCIAL

## 2025-03-06 PROCEDURE — RXMED WILLOW AMBULATORY MEDICATION CHARGE

## 2025-03-09 ENCOUNTER — PATIENT MESSAGE (OUTPATIENT)
Dept: PRIMARY CARE | Facility: CLINIC | Age: 30
End: 2025-03-09
Payer: COMMERCIAL

## 2025-03-09 DIAGNOSIS — Z23 ENCOUNTER FOR IMMUNIZATION: Primary | ICD-10-CM

## 2025-03-13 ENCOUNTER — PHARMACY VISIT (OUTPATIENT)
Dept: PHARMACY | Facility: CLINIC | Age: 30
End: 2025-03-13
Payer: COMMERCIAL

## 2025-03-22 LAB
ANION GAP SERPL CALCULATED.4IONS-SCNC: 10 MMOL/L (CALC) (ref 7–17)
BASOPHILS # BLD AUTO: 21 CELLS/UL (ref 0–200)
BASOPHILS NFR BLD AUTO: 0.3 %
BUN SERPL-MCNC: 14 MG/DL (ref 7–25)
BUN/CREAT SERPL: NORMAL (CALC) (ref 6–22)
C3 SERPL-MCNC: NORMAL MG/DL
C4 SERPL-MCNC: NORMAL MG/DL
CALCIUM SERPL-MCNC: 9.3 MG/DL (ref 8.6–10.2)
CHLORIDE SERPL-SCNC: 106 MMOL/L (ref 98–110)
CO2 SERPL-SCNC: 22 MMOL/L (ref 20–32)
CREAT SERPL-MCNC: 0.66 MG/DL (ref 0.5–0.96)
CREAT UR-MCNC: NORMAL MG/DL
DSDNA AB SER-ACNC: NORMAL [IU]/ML
EGFRCR SERPLBLD CKD-EPI 2021: 122 ML/MIN/1.73M2
EOSINOPHIL # BLD AUTO: 90 CELLS/UL (ref 15–500)
EOSINOPHIL NFR BLD AUTO: 1.3 %
ERYTHROCYTE [DISTWIDTH] IN BLOOD BY AUTOMATED COUNT: 13 % (ref 11–15)
ERYTHROCYTE [SEDIMENTATION RATE] IN BLOOD BY WESTERGREN METHOD: 2 MM/H
GLUCOSE SERPL-MCNC: 87 MG/DL (ref 65–99)
HBV SURFACE AB SERPL IA-ACNC: 15 MIU/ML
HCT VFR BLD AUTO: 42.3 % (ref 35–45)
HGB BLD-MCNC: 13.6 G/DL (ref 11.7–15.5)
LYMPHOCYTES # BLD AUTO: 2325 CELLS/UL (ref 850–3900)
LYMPHOCYTES NFR BLD AUTO: 33.7 %
MCH RBC QN AUTO: 26.3 PG (ref 27–33)
MCHC RBC AUTO-ENTMCNC: 32.2 G/DL (ref 32–36)
MCV RBC AUTO: 81.7 FL (ref 80–100)
MEV IGG SER IA-ACNC: NORMAL
MONOCYTES # BLD AUTO: 773 CELLS/UL (ref 200–950)
MONOCYTES NFR BLD AUTO: 11.2 %
MUV IGG SER IA-ACNC: NORMAL
NEUTROPHILS # BLD AUTO: 3692 CELLS/UL (ref 1500–7800)
NEUTROPHILS NFR BLD AUTO: 53.5 %
PLATELET # BLD AUTO: 241 THOUSAND/UL (ref 140–400)
PMV BLD REES-ECKER: 9.8 FL (ref 7.5–12.5)
POTASSIUM SERPL-SCNC: 4 MMOL/L (ref 3.5–5.3)
PROT UR-MCNC: NORMAL G/DL
PROT/CREAT UR: NORMAL MG/G{CREAT}
PROT/CREAT UR: NORMAL MG/G{CREAT}
RBC # BLD AUTO: 5.18 MILLION/UL (ref 3.8–5.1)
RUBV IGG SERPL IA-ACNC: NORMAL
SODIUM SERPL-SCNC: 138 MMOL/L (ref 135–146)
VZV IGG SER IA-ACNC: NORMAL
WBC # BLD AUTO: 6.9 THOUSAND/UL (ref 3.8–10.8)

## 2025-03-24 LAB
ANION GAP SERPL CALCULATED.4IONS-SCNC: 10 MMOL/L (CALC) (ref 7–17)
BASOPHILS # BLD AUTO: 21 CELLS/UL (ref 0–200)
BASOPHILS NFR BLD AUTO: 0.3 %
BUN SERPL-MCNC: 14 MG/DL (ref 7–25)
BUN/CREAT SERPL: NORMAL (CALC) (ref 6–22)
C3 SERPL-MCNC: 121 MG/DL (ref 83–193)
C4 SERPL-MCNC: 34 MG/DL (ref 15–57)
CALCIUM SERPL-MCNC: 9.3 MG/DL (ref 8.6–10.2)
CHLORIDE SERPL-SCNC: 106 MMOL/L (ref 98–110)
CO2 SERPL-SCNC: 22 MMOL/L (ref 20–32)
CREAT SERPL-MCNC: 0.66 MG/DL (ref 0.5–0.96)
DSDNA AB SER-ACNC: <1 IU/ML
EGFRCR SERPLBLD CKD-EPI 2021: 122 ML/MIN/1.73M2
EOSINOPHIL # BLD AUTO: 90 CELLS/UL (ref 15–500)
EOSINOPHIL NFR BLD AUTO: 1.3 %
ERYTHROCYTE [DISTWIDTH] IN BLOOD BY AUTOMATED COUNT: 13 % (ref 11–15)
ERYTHROCYTE [SEDIMENTATION RATE] IN BLOOD BY WESTERGREN METHOD: 2 MM/H
GLUCOSE SERPL-MCNC: 87 MG/DL (ref 65–99)
HBV SURFACE AB SERPL IA-ACNC: 15 MIU/ML
HCT VFR BLD AUTO: 42.3 % (ref 35–45)
HGB BLD-MCNC: 13.6 G/DL (ref 11.7–15.5)
LYMPHOCYTES # BLD AUTO: 2325 CELLS/UL (ref 850–3900)
LYMPHOCYTES NFR BLD AUTO: 33.7 %
MCH RBC QN AUTO: 26.3 PG (ref 27–33)
MCHC RBC AUTO-ENTMCNC: 32.2 G/DL (ref 32–36)
MCV RBC AUTO: 81.7 FL (ref 80–100)
MEV IGG SER IA-ACNC: 85.4 AU/ML
MONOCYTES # BLD AUTO: 773 CELLS/UL (ref 200–950)
MONOCYTES NFR BLD AUTO: 11.2 %
MUV IGG SER IA-ACNC: 15.7 AU/ML
NEUTROPHILS # BLD AUTO: 3692 CELLS/UL (ref 1500–7800)
NEUTROPHILS NFR BLD AUTO: 53.5 %
PLATELET # BLD AUTO: 241 THOUSAND/UL (ref 140–400)
PMV BLD REES-ECKER: 9.8 FL (ref 7.5–12.5)
POTASSIUM SERPL-SCNC: 4 MMOL/L (ref 3.5–5.3)
RBC # BLD AUTO: 5.18 MILLION/UL (ref 3.8–5.1)
RUBV IGG SERPL IA-ACNC: 0.97 INDEX
SODIUM SERPL-SCNC: 138 MMOL/L (ref 135–146)
VZV IGG SER IA-ACNC: 1.68 S/CO
WBC # BLD AUTO: 6.9 THOUSAND/UL (ref 3.8–10.8)

## 2025-03-25 LAB
CREAT UR-MCNC: 24 MG/DL (ref 20–275)
PROT UR-MCNC: 4 MG/DL (ref 5–24)
PROT/CREAT UR: 0.17 MG/MG CREAT (ref 0.02–0.18)
PROT/CREAT UR: 167 MG/G CREAT (ref 24–184)

## 2025-03-28 ENCOUNTER — APPOINTMENT (OUTPATIENT)
Dept: PRIMARY CARE | Facility: CLINIC | Age: 30
End: 2025-03-28
Payer: COMMERCIAL

## 2025-03-28 VITALS
BODY MASS INDEX: 30.43 KG/M2 | SYSTOLIC BLOOD PRESSURE: 112 MMHG | DIASTOLIC BLOOD PRESSURE: 78 MMHG | WEIGHT: 155 LBS | RESPIRATION RATE: 16 BRPM | HEIGHT: 60 IN | HEART RATE: 84 BPM | TEMPERATURE: 98 F | OXYGEN SATURATION: 98 %

## 2025-03-28 DIAGNOSIS — M32.9 SYSTEMIC LUPUS ERYTHEMATOSUS, UNSPECIFIED SLE TYPE, UNSPECIFIED ORGAN INVOLVEMENT STATUS (MULTI): ICD-10-CM

## 2025-03-28 DIAGNOSIS — H91.93 BILATERAL HEARING LOSS, UNSPECIFIED HEARING LOSS TYPE: Primary | ICD-10-CM

## 2025-03-28 DIAGNOSIS — F33.1 MODERATE EPISODE OF RECURRENT MAJOR DEPRESSIVE DISORDER: ICD-10-CM

## 2025-03-28 PROCEDURE — 3008F BODY MASS INDEX DOCD: CPT | Performed by: STUDENT IN AN ORGANIZED HEALTH CARE EDUCATION/TRAINING PROGRAM

## 2025-03-28 PROCEDURE — 1036F TOBACCO NON-USER: CPT | Performed by: STUDENT IN AN ORGANIZED HEALTH CARE EDUCATION/TRAINING PROGRAM

## 2025-03-28 PROCEDURE — 99214 OFFICE O/P EST MOD 30 MIN: CPT | Performed by: STUDENT IN AN ORGANIZED HEALTH CARE EDUCATION/TRAINING PROGRAM

## 2025-03-28 RX ORDER — ALBUTEROL SULFATE 90 UG/1
2 INHALANT RESPIRATORY (INHALATION) EVERY 4 HOURS PRN
Qty: 18 G | Refills: 11 | Status: SHIPPED | OUTPATIENT
Start: 2025-03-28 | End: 2025-03-28 | Stop reason: SDUPTHER

## 2025-03-28 RX ORDER — ALBUTEROL SULFATE 90 UG/1
2 INHALANT RESPIRATORY (INHALATION) EVERY 4 HOURS PRN
Qty: 18 G | Refills: 11 | Status: SHIPPED | OUTPATIENT
Start: 2025-03-28

## 2025-03-28 RX ORDER — GLUCOSAM/CHONDRO/HERB 149/HYAL 750-100 MG
TABLET ORAL
COMMUNITY

## 2025-03-28 NOTE — PROGRESS NOTES
"Kori Scanlon \"Bea\" is a 29 y.o. female who presents for Follow-up (Pt would like a hearing test.).  Patient seen a for multiple concerns, she is feels she is having significantly worse hearing, she has previously failed hearing test has not followed up with audiology or ENT, this is a progressively chronic situation, has not had any previous evaluation.  Like referrals today.    Patient is also suffering from significant changes in mood, worsening depression.  She does have evidence of self harm on her right wrist, superficial blood present.  She states she is in a difficult situation between marital difficulties as well as her close friend moving back to [place] for school, she is having difficulties with both her parents and her 's parents.  She states been progressively worsening.  She is on Wellbutrin she states it is helping but not as much as before.    She states that the situation is causing most of her stress and mood concerns, does not think additional medication will help, is interested in seeing a new counselor/therapist.        Review of Systems    Objective   Physical Exam  Vitals reviewed.   Constitutional:       General: She is not in acute distress.     Appearance: Normal appearance. She is not toxic-appearing.   HENT:      Head: Normocephalic and atraumatic.      Nose: Nose normal.   Eyes:      Extraocular Movements: Extraocular movements intact.   Cardiovascular:      Rate and Rhythm: Normal rate and regular rhythm.      Heart sounds: No murmur heard.     No friction rub. No gallop.   Pulmonary:      Effort: Pulmonary effort is normal. No respiratory distress.      Breath sounds: Normal breath sounds. No wheezing, rhonchi or rales.   Skin:     General: Skin is warm and dry.   Neurological:      General: No focal deficit present.      Mental Status: She is alert.   Psychiatric:         Mood and Affect: Mood normal.         Behavior: Behavior normal.         Assessment/Plan "   Problem List Items Addressed This Visit       Depression    Relevant Orders    Follow Up In [facility] - [facility]al [facility] Collaborative Care CoCM    SLE (systemic lupus erythematosus) (Multi)    Relevant Medications    albuterol 90 mcg/actuation inhaler     Other Visit Diagnoses       Bilateral hearing loss, unspecified hearing loss type    -  Primary    Relevant Orders    Referral to ENT    Referral to Audiology          Patient seen in for multiple concerns, discussed potentially starting new medication versus continuing follow-up with behavioral health, did put in a referral today.  We will refill butyryl inhaler    Will put a referral to ENT and audiology for hearing loss.    Follow-up as new concerns arise.

## 2025-03-31 ENCOUNTER — SPECIALTY PHARMACY (OUTPATIENT)
Dept: PHARMACY | Facility: CLINIC | Age: 30
End: 2025-03-31

## 2025-04-01 PROCEDURE — RXMED WILLOW AMBULATORY MEDICATION CHARGE

## 2025-04-07 ENCOUNTER — TELEPHONE (OUTPATIENT)
Dept: PRIMARY CARE | Facility: CLINIC | Age: 30
End: 2025-04-07
Payer: COMMERCIAL

## 2025-04-08 ENCOUNTER — APPOINTMENT (OUTPATIENT)
Dept: RHEUMATOLOGY | Facility: CLINIC | Age: 30
End: 2025-04-08
Payer: COMMERCIAL

## 2025-04-08 VITALS
WEIGHT: 156.4 LBS | BODY MASS INDEX: 30.7 KG/M2 | RESPIRATION RATE: 14 BRPM | HEART RATE: 89 BPM | TEMPERATURE: 97.8 F | SYSTOLIC BLOOD PRESSURE: 109 MMHG | DIASTOLIC BLOOD PRESSURE: 74 MMHG | OXYGEN SATURATION: 99 % | HEIGHT: 60 IN

## 2025-04-08 DIAGNOSIS — M32.9 SYSTEMIC LUPUS ERYTHEMATOSUS, UNSPECIFIED SLE TYPE, UNSPECIFIED ORGAN INVOLVEMENT STATUS (MULTI): ICD-10-CM

## 2025-04-08 DIAGNOSIS — K11.7 XEROSTOMIA: Primary | ICD-10-CM

## 2025-04-08 DIAGNOSIS — Z00.00 HEALTHCARE MAINTENANCE: ICD-10-CM

## 2025-04-08 PROCEDURE — 99214 OFFICE O/P EST MOD 30 MIN: CPT | Performed by: INTERNAL MEDICINE

## 2025-04-08 PROCEDURE — 3008F BODY MASS INDEX DOCD: CPT | Performed by: INTERNAL MEDICINE

## 2025-04-08 RX ORDER — PILOCARPINE HYDROCHLORIDE 5 MG/1
5 TABLET, FILM COATED ORAL 3 TIMES DAILY
Qty: 90 TABLET | Refills: 2 | Status: SHIPPED | OUTPATIENT
Start: 2025-04-08 | End: 2026-04-08

## 2025-04-08 ASSESSMENT — PAIN SCALES - GENERAL: PAINLEVEL_OUTOF10: 7

## 2025-04-08 NOTE — PROGRESS NOTES
Subjective   Patient ID: Bea Scanlon is a 30 y.o. female who presents for Follow-up (1 year f/u).    HPI  31 yo F here for f/u re: SLE. She remains on Benlysta 200 mg SC weekly and hydroxychloroquine 200 mg daily.      She lost 40 pounds over the last year.  She acknowledges that she was using marijuana and alcohol last year inappropriately , and she has stopped doing this .  She is walking more for exercise and is doing resistance training .  Current BMI is down to 30.    She is not having as much knee pain.    She is going to see a   to undergo counseling to deal with her anxiety.      She thinks she has some hearing loss, she has an upcoming appointment with ENT.    She was told by her dentist that she was having some decalcification of her teeth.  She is currently using PreviDent toothpaste.     She still has mild dry eyes for which she uses Systane.  She had eye exam with Dr. Davis 2/24.  He recommended iVizia for dry eyes.     Her dentist gave her a high fluoride toothpaste to use for dry mouth.  She is also using a Waterpik and alcohol free mouthwash.     She is currently walking 8 miles per week.  She is starting with weights.  She feels like her left knee pops out of place.     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     She still has Raynaud's but no digital ulcers.      She did get  January 2022.     She was previously on antidepressants since sixth grade.  She states that they never helped.  She currently still works with a psychologist.     Labs 10/17: DILLON 1:320 with negative panel, rheumatoid factor negative, citrulline antibody negative, CBC normal, CMP normal, CRP negative, ESR 8.  Labs March 2025: CBC normal, BMP normal, ESR 2, double-stranded DNA negative, C3 and C4 normal, spot urine protein creatinine ratio 0.167     Echo 6/20: normal.     Social history: . does not smoke. Uses alcohol occasionally  socially.     Medical problem list:    - SLE- DILLON 1:320 (negative panel), arthritis   - Fibromyalgia-    - History of migraine headaches          Review of Systems  General: Denies fevers or chills.  CV: Denies chest pain or palpitations.  Denies leg edema.  Lungs: Denies coughing or shortness of breath.  Skin: Denies rashes or nodules.  MS: Denies joint pain or joint swelling.     Objective   /74 (BP Location: Left arm, Patient Position: Sitting, BP Cuff Size: Adult)   Pulse 89   Temp 36.6 °C (97.8 °F) (Skin)   Resp 14   Ht 1.524 m (5')   Wt 70.9 kg (156 lb 6.4 oz)   SpO2 99%   BMI 30.54 kg/m²     Physical Exam  General appearance: Well-nourished and well-appearing.  HEENT: PERRL, EOMI  Neck: Supple, no nodes.  CV: RRR, no MGR.  Lungs: Clear, no rales or wheezes.  Abdomen: Soft, nontender. No hepatosplenomegaly.  Extremities:  No cyanosis, clubbing, or edema.  MS: No synovitis.  Skin: No rashes or nodules.       Assessment/Plan   Problem List Items Addressed This Visit             ICD-10-CM    SLE (systemic lupus erythematosus) (Multi) M32.9    Relevant Orders    CBC and Auto Differential    Basic Metabolic Panel    Sedimentation Rate    Protein, Urine Random    Creatinine, Urine Random    BMI 30.0-30.9,adult Z68.30     Other Visit Diagnoses         Codes    Xerostomia    -  Primary K11.7    Relevant Medications    pilocarpine (Salagen, pilocarpine,) 5 mg tablet    Healthcare maintenance     Z00.00    Relevant Orders    Lipid panel            1. SLE-doing better since she resumed her medication early 2023.     2. Abnormal EKG- inverted T waves in leads 3, aVF, V3 and V4. Echo 6/20 unremarkable. She had appointment  with Dr. Escobar in cardiology 11/23 due to recurrent tachycardia.  Holter monitor showed only sinus tachycardia. Echo was recommended.     3. h/o frequent GERD which is occurring despite omeprazole 20 mg daily. Saw Dr. Gupta 7/20. EGD and colonoscopy were unremarkable. Bravo pH monitor was  normal. Dr. Gupta did not find a correlation between acid reflux and either chest pain or cough. Dr. Tafoya recommended esophageal manometry 12/23.     4. BMI 30-currently working on weight loss. She lost 40 pounds over the last year.     5. Anxiety-she is going to meet with a  for counseling to assist with anxiety.     6.  Mild dry mouth- She declines treatment with pilocarpine plan:    Plan:  Check fasting labs 7/25: CBC with diff, BMP, ESR, lipids, spot urine protein to creatinine ratio.  Follow-up in 4 months.

## 2025-04-08 NOTE — PATIENT INSTRUCTIONS
Check fasting labs 7/25: CBC with diff, BMP, ESR, lipids, spot urine protein to creatinine ratio.  Follow-up in 4 months.

## 2025-04-10 ENCOUNTER — PHARMACY VISIT (OUTPATIENT)
Dept: PHARMACY | Facility: CLINIC | Age: 30
End: 2025-04-10
Payer: COMMERCIAL

## 2025-04-16 ENCOUNTER — APPOINTMENT (OUTPATIENT)
Dept: OPHTHALMOLOGY | Facility: CLINIC | Age: 30
End: 2025-04-16
Payer: COMMERCIAL

## 2025-04-16 DIAGNOSIS — M32.9 SYSTEMIC LUPUS ERYTHEMATOSUS, UNSPECIFIED SLE TYPE, UNSPECIFIED ORGAN INVOLVEMENT STATUS (MULTI): ICD-10-CM

## 2025-04-16 DIAGNOSIS — H52.203 MYOPIA OF BOTH EYES WITH ASTIGMATISM: ICD-10-CM

## 2025-04-16 DIAGNOSIS — Z79.899 LONG-TERM USE OF PLAQUENIL: Primary | ICD-10-CM

## 2025-04-16 DIAGNOSIS — H52.13 MYOPIA OF BOTH EYES WITH ASTIGMATISM: ICD-10-CM

## 2025-04-16 DIAGNOSIS — H50.15 ALTERNATING EXOTROPIA: ICD-10-CM

## 2025-04-16 PROCEDURE — 92134 CPTRZ OPH DX IMG PST SGM RTA: CPT | Mod: BILATERAL PROCEDURE | Performed by: OPTOMETRIST

## 2025-04-16 PROCEDURE — 92014 COMPRE OPH EXAM EST PT 1/>: CPT | Performed by: OPTOMETRIST

## 2025-04-16 PROCEDURE — 92015 DETERMINE REFRACTIVE STATE: CPT | Performed by: OPTOMETRIST

## 2025-04-16 PROCEDURE — 92083 EXTENDED VISUAL FIELD XM: CPT | Performed by: OPTOMETRIST

## 2025-04-16 ASSESSMENT — VISUAL ACUITY
OD_CC: J1+
OD_CC: 20/20-2
OS_CC: J1+
METHOD: SNELLEN - LINEAR
OS_CC: 20/25

## 2025-04-16 ASSESSMENT — REFRACTION_MANIFEST
OS_SPHERE: -0.50
OS_CYLINDER: -0.50
OD_AXIS: 165
OD_CYLINDER: -0.75
OS_AXIS: 065
OD_SPHERE: -1.50

## 2025-04-16 ASSESSMENT — ENCOUNTER SYMPTOMS
EYES NEGATIVE: 1
RESPIRATORY NEGATIVE: 1

## 2025-04-16 ASSESSMENT — TONOMETRY
OD_IOP_MMHG: 14
OS_IOP_MMHG: 12
IOP_METHOD: GOLDMANN APPLANATION

## 2025-04-16 ASSESSMENT — SLIT LAMP EXAM - LIDS
COMMENTS: GOOD POSITION
COMMENTS: GOOD POSITION

## 2025-04-16 ASSESSMENT — REFRACTION_WEARINGRX
OS_SPHERE: -0.50
OS_CYLINDER: -0.50
OD_CYLINDER: -0.50
OD_SPHERE: -1.25
OS_AXIS: 060
OD_AXIS: 165

## 2025-04-16 ASSESSMENT — CUP TO DISC RATIO
OS_RATIO: .3
OD_RATIO: .3

## 2025-04-16 ASSESSMENT — EXTERNAL EXAM - LEFT EYE: OS_EXAM: NORMAL

## 2025-04-16 ASSESSMENT — EXTERNAL EXAM - RIGHT EYE: OD_EXAM: NORMAL

## 2025-04-16 ASSESSMENT — CONF VISUAL FIELD: COMMENTS: SEE HVF

## 2025-04-16 NOTE — PROGRESS NOTES
SLE.  Plaquenil therapy.  The patient has taken 200 mg of hydroxychloroquine (Plaquenil) QD for 7 years.  Studies reveal that the risk of maculopathy when taking Plaquenil is 0% (0-5 years), 1% (over 5 years), 2% (over 10 years), and 20% cumulative,   or 4% annual incidence  (over 20 years) respectively.  Annual testing with 10-2 threshold visual field perimetry, as well as spectral domain optical coherence tomography of the macula is recommended.        Optical coherence tomography of the macula revealed:    OD: Normal foveal contour, photoreceptor, retinal pigment epithelium, IS/OS junction, central field 252 was 250 was 247 micron.  Vitreous base visualized.   OS:  Normal foveal contour, photoreceptor, retinal pigment epithelium, IS/OS junction, central field 247 was 250 was 251 micron.  Vitreous base visualized.     A Jacobs 10-2 threshold visual field test was done.  Results were:   OD: the absence of scotoma, pattern standard deviation 1.25 was 1.33 was 2.20 dB   OS: the absence of scotoma, pattern standard deviation 1.16 was 1.07 was 3.75 dB     A spectacle prescription was given at the patient`s request.      Olopatadine was prescribed for chronic ocular allergies.  Use BID OU.      Eyes were burning and sensitive to light but better now. Has tinted lenses in glasses. iVizia coupon provided use 4x/day, is using Systane TID, is using humidifier in bedroom.    Intermittent left exotropia OS. Referred to Dr. Andrade pediatrics for possible surgical intevention as wearing glasses doesn't help.     The patient was asked to return to our clinic in one year or sooner if ocular or vision changes occur.   Next available Dr Andrade,  1 year plaquenil and full exam with Susan

## 2025-04-28 DIAGNOSIS — F32.1 CURRENT MODERATE EPISODE OF MAJOR DEPRESSIVE DISORDER, UNSPECIFIED WHETHER RECURRENT (MULTI): ICD-10-CM

## 2025-04-28 RX ORDER — BUPROPION HYDROCHLORIDE 300 MG/1
300 TABLET ORAL DAILY
Qty: 90 TABLET | Refills: 1 | Status: SHIPPED | OUTPATIENT
Start: 2025-04-28 | End: 2025-10-25

## 2025-05-02 ENCOUNTER — SPECIALTY PHARMACY (OUTPATIENT)
Dept: PHARMACY | Facility: CLINIC | Age: 30
End: 2025-05-02

## 2025-05-02 PROCEDURE — RXMED WILLOW AMBULATORY MEDICATION CHARGE

## 2025-05-05 ENCOUNTER — APPOINTMENT (OUTPATIENT)
Dept: PRIMARY CARE | Facility: CLINIC | Age: 30
End: 2025-05-05
Payer: COMMERCIAL

## 2025-05-05 ASSESSMENT — ANXIETY QUESTIONNAIRES
2. NOT BEING ABLE TO STOP OR CONTROL WORRYING: MORE THAN HALF THE DAYS
5. BEING SO RESTLESS THAT IT IS HARD TO SIT STILL: NOT AT ALL
4. TROUBLE RELAXING: MORE THAN HALF THE DAYS
GAD7 TOTAL SCORE: 11
IF YOU CHECKED OFF ANY PROBLEMS ON THIS QUESTIONNAIRE, HOW DIFFICULT HAVE THESE PROBLEMS MADE IT FOR YOU TO DO YOUR WORK, TAKE CARE OF THINGS AT HOME, OR GET ALONG WITH OTHER PEOPLE: SOMEWHAT DIFFICULT
3. WORRYING TOO MUCH ABOUT DIFFERENT THINGS: MORE THAN HALF THE DAYS
6. BECOMING EASILY ANNOYED OR IRRITABLE: SEVERAL DAYS
7. FEELING AFRAID AS IF SOMETHING AWFUL MIGHT HAPPEN: MORE THAN HALF THE DAYS
1. FEELING NERVOUS, ANXIOUS, OR ON EDGE: MORE THAN HALF THE DAYS

## 2025-05-05 ASSESSMENT — PATIENT HEALTH QUESTIONNAIRE - PHQ9
6. FEELING BAD ABOUT YOURSELF - OR THAT YOU ARE A FAILURE OR HAVE LET YOURSELF OR YOUR FAMILY DOWN: MORE THAN HALF THE DAYS
9. THOUGHTS THAT YOU WOULD BE BETTER OFF DEAD, OR OF HURTING YOURSELF: SEVERAL DAYS
2. FEELING DOWN, DEPRESSED OR HOPELESS: MORE THAN HALF THE DAYS
1. LITTLE INTEREST OR PLEASURE IN DOING THINGS: MORE THAN HALF THE DAYS
3. TROUBLE FALLING OR STAYING ASLEEP: MORE THAN HALF THE DAYS
4. FEELING TIRED OR HAVING LITTLE ENERGY: NEARLY EVERY DAY
8. MOVING OR SPEAKING SO SLOWLY THAT OTHER PEOPLE COULD HAVE NOTICED. OR THE OPPOSITE, BEING SO FIGETY OR RESTLESS THAT YOU HAVE BEEN MOVING AROUND A LOT MORE THAN USUAL: NOT AT ALL
10. IF YOU CHECKED OFF ANY PROBLEMS, HOW DIFFICULT HAVE THESE PROBLEMS MADE IT FOR YOU TO DO YOUR WORK, TAKE CARE OF THINGS AT HOME, OR GET ALONG WITH OTHER PEOPLE: SOMEWHAT DIFFICULT
7. TROUBLE CONCENTRATING ON THINGS, SUCH AS READING THE NEWSPAPER OR WATCHING TELEVISION: NOT AT ALL
5. POOR APPETITE OR OVEREATING: SEVERAL DAYS
SUM OF ALL RESPONSES TO PHQ QUESTIONS 1-9: 13
SUM OF ALL RESPONSES TO PHQ9 QUESTIONS 1 & 2: 4
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
## 2025-05-06 ENCOUNTER — PHARMACY VISIT (OUTPATIENT)
Dept: PHARMACY | Facility: CLINIC | Age: 30
End: 2025-05-06
Payer: COMMERCIAL

## 2025-05-07 ENCOUNTER — DOCUMENTATION (OUTPATIENT)
Dept: BEHAVIORAL HEALTH | Facility: CLINIC | Age: 30
End: 2025-05-07
Payer: COMMERCIAL

## 2025-05-07 NOTE — PROGRESS NOTES
"[facility] Psychiatry Consult Note     Leah Scanlon is a 30 y.o., referred to Collaborative Care for symptoms of depression and anxiety. I have reviewed the patient with the behavioral health manager and reviewed the patient's electronic record. In middle of separation/divorce. Anxiety and depression her whole life. Does report panic attack symptoms about once a month, can't identify specific triggers. History of trauma as child. 10 past suicide attempts, most recently 4 months ago - has been admitted only a few times. Also has history of self harm (cutting). Describes history of delusions and hallucinations - last one in 2022. Drinks a \"mini-bottle\" of wine daily.    History of psychotherapy age [age] - which she thought was helpful - stopped once     Current Meds:  Wellbutrin XL 300mg daily     Past Meds:  Tried sertraline - was on for a while, doesn't why she was on it or taken off  Sapphris/Seroquel/escitalopram - side effects to all    Recommendations:   Can add sertraline 50mg daily for anxiety and depression, can increase by 50mg daily every 4 weeks to max daily dose of 200mg daily.  Consider referral for DBT, consider longer term counseling  Cont Wellbutrin at this time      Patient Health Questionnaire-9 Score: 13 (5/5/2025 12:46 PM)  STEPHANIE-7 Total Score: 11 (5/5/2025 12:48 PM)      The above treatment considerations and suggestions are based on consultations with the patient's care manager and a review of information available in the electronic medical record. I have not personally examined the patient. All recommendations should be implemented with consideration of the patient's relevant prior history and current clinical status. Please feel free to call me with any questions about the care of this patient.     "

## 2025-05-08 ENCOUNTER — APPOINTMENT (OUTPATIENT)
Dept: OTOLARYNGOLOGY | Facility: CLINIC | Age: 30
End: 2025-05-08
Payer: COMMERCIAL

## 2025-05-08 ENCOUNTER — APPOINTMENT (OUTPATIENT)
Dept: AUDIOLOGY | Facility: CLINIC | Age: 30
End: 2025-05-08
Payer: COMMERCIAL

## 2025-05-22 ENCOUNTER — APPOINTMENT (OUTPATIENT)
Dept: AUDIOLOGY | Facility: CLINIC | Age: 30
End: 2025-05-22
Payer: COMMERCIAL

## 2025-05-27 ENCOUNTER — APPOINTMENT (OUTPATIENT)
Dept: PRIMARY CARE | Facility: CLINIC | Age: 30
End: 2025-05-27
Payer: COMMERCIAL

## 2025-05-27 NOTE — PROGRESS NOTES
Collaborative Care (CoCM)  Progress Note    Type of Interaction: In Office    Start Time: 1100 am    End Time: 1135 am        Appointment: Scheduled    Reason for Visit:   Chief Complaint   Patient presents with    Anxiety    Depression          Interventions Provided: Motivational Interviewing and Solution Focused Therapy      Progress Made: N/A    Response to Intervention: The patient and BHM discussed the recommendations provided by psychiatry. They explored the potential benefits of working with a long-term therapist to support her mental health. The patient was given a list of providers who accept her insurance and offer DBT (Dialectical Behavior Therapy) services.  Pt reports she is not interested in medication for her anxiety at this time.

## 2025-05-31 PROCEDURE — 99494 1ST/SBSQ PSYC COLLAB CARE: CPT | Performed by: STUDENT IN AN ORGANIZED HEALTH CARE EDUCATION/TRAINING PROGRAM

## 2025-05-31 PROCEDURE — 99492 1ST PSYC COLLAB CARE MGMT: CPT | Performed by: STUDENT IN AN ORGANIZED HEALTH CARE EDUCATION/TRAINING PROGRAM

## 2025-06-02 ENCOUNTER — DOCUMENTATION (OUTPATIENT)
Dept: PRIMARY CARE | Facility: CLINIC | Age: 30
End: 2025-06-02
Payer: COMMERCIAL

## 2025-06-02 DIAGNOSIS — F41.9 ANXIETY: Primary | ICD-10-CM

## 2025-06-05 ENCOUNTER — APPOINTMENT (OUTPATIENT)
Dept: PRIMARY CARE | Facility: CLINIC | Age: 30
End: 2025-06-05
Payer: COMMERCIAL

## 2025-06-13 ENCOUNTER — SPECIALTY PHARMACY (OUTPATIENT)
Dept: PHARMACY | Facility: CLINIC | Age: 30
End: 2025-06-13

## 2025-06-13 PROCEDURE — RXMED WILLOW AMBULATORY MEDICATION CHARGE

## 2025-06-19 ENCOUNTER — PHARMACY VISIT (OUTPATIENT)
Dept: PHARMACY | Facility: CLINIC | Age: 30
End: 2025-06-19
Payer: COMMERCIAL

## 2025-06-30 DIAGNOSIS — M32.9 SYSTEMIC LUPUS ERYTHEMATOSUS, UNSPECIFIED SLE TYPE, UNSPECIFIED ORGAN INVOLVEMENT STATUS (MULTI): ICD-10-CM

## 2025-06-30 RX ORDER — HYDROXYCHLOROQUINE SULFATE 200 MG/1
200 TABLET, FILM COATED ORAL DAILY
Qty: 30 TABLET | Refills: 5 | Status: SHIPPED | OUTPATIENT
Start: 2025-06-30

## 2025-07-01 DIAGNOSIS — M32.9 SYSTEMIC LUPUS ERYTHEMATOSUS, UNSPECIFIED SLE TYPE, UNSPECIFIED ORGAN INVOLVEMENT STATUS (MULTI): ICD-10-CM

## 2025-07-01 DIAGNOSIS — Z00.00 HEALTHCARE MAINTENANCE: ICD-10-CM

## 2025-07-16 ENCOUNTER — CLINICAL SUPPORT (OUTPATIENT)
Dept: AUDIOLOGY | Facility: CLINIC | Age: 30
End: 2025-07-16
Payer: COMMERCIAL

## 2025-07-16 ENCOUNTER — OFFICE VISIT (OUTPATIENT)
Dept: OTOLARYNGOLOGY | Facility: CLINIC | Age: 30
End: 2025-07-16
Payer: COMMERCIAL

## 2025-07-16 VITALS
WEIGHT: 155 LBS | TEMPERATURE: 98.3 F | HEIGHT: 60 IN | SYSTOLIC BLOOD PRESSURE: 121 MMHG | DIASTOLIC BLOOD PRESSURE: 80 MMHG | HEART RATE: 69 BPM | BODY MASS INDEX: 30.43 KG/M2

## 2025-07-16 DIAGNOSIS — H90.3 ASYMMETRIC SNHL (SENSORINEURAL HEARING LOSS): Primary | ICD-10-CM

## 2025-07-16 DIAGNOSIS — H93.13 TINNITUS, BILATERAL: Primary | ICD-10-CM

## 2025-07-16 DIAGNOSIS — H90.3 SNHL (SENSORY-NEURAL HEARING LOSS), ASYMMETRICAL: ICD-10-CM

## 2025-07-16 DIAGNOSIS — H93.13 TINNITUS OF BOTH EARS: ICD-10-CM

## 2025-07-16 PROCEDURE — 1036F TOBACCO NON-USER: CPT | Performed by: NURSE PRACTITIONER

## 2025-07-16 PROCEDURE — 3008F BODY MASS INDEX DOCD: CPT | Performed by: NURSE PRACTITIONER

## 2025-07-16 PROCEDURE — 92557 COMPREHENSIVE HEARING TEST: CPT | Performed by: AUDIOLOGIST

## 2025-07-16 PROCEDURE — 99214 OFFICE O/P EST MOD 30 MIN: CPT | Performed by: NURSE PRACTITIONER

## 2025-07-16 PROCEDURE — 99204 OFFICE O/P NEW MOD 45 MIN: CPT | Performed by: NURSE PRACTITIONER

## 2025-07-16 PROCEDURE — 92550 TYMPANOMETRY & REFLEX THRESH: CPT | Mod: 52 | Performed by: AUDIOLOGIST

## 2025-07-16 SDOH — ECONOMIC STABILITY: FOOD INSECURITY: WITHIN THE PAST 12 MONTHS, YOU WORRIED THAT YOUR FOOD WOULD RUN OUT BEFORE YOU GOT MONEY TO BUY MORE.: NEVER TRUE

## 2025-07-16 SDOH — ECONOMIC STABILITY: FOOD INSECURITY: WITHIN THE PAST 12 MONTHS, THE FOOD YOU BOUGHT JUST DIDN'T LAST AND YOU DIDN'T HAVE MONEY TO GET MORE.: NEVER TRUE

## 2025-07-16 ASSESSMENT — LIFESTYLE VARIABLES
HOW OFTEN DO YOU HAVE A DRINK CONTAINING ALCOHOL: 2-3 TIMES A WEEK
HOW MANY STANDARD DRINKS CONTAINING ALCOHOL DO YOU HAVE ON A TYPICAL DAY: 1 OR 2
SKIP TO QUESTIONS 9-10: 1
HOW OFTEN DO YOU HAVE SIX OR MORE DRINKS ON ONE OCCASION: NEVER
AUDIT-C TOTAL SCORE: 3

## 2025-07-16 ASSESSMENT — ENCOUNTER SYMPTOMS
SORE THROAT: 0
NECK PAIN: 0
DEPRESSION: 0
DIARRHEA: 0
ABDOMINAL PAIN: 0
HEADACHES: 1
VOMITING: 0
OCCASIONAL FEELINGS OF UNSTEADINESS: 0
LOSS OF SENSATION IN FEET: 0
RHINORRHEA: 0
COUGH: 0

## 2025-07-16 ASSESSMENT — COLUMBIA-SUICIDE SEVERITY RATING SCALE - C-SSRS
1. IN THE PAST MONTH, HAVE YOU WISHED YOU WERE DEAD OR WISHED YOU COULD GO TO SLEEP AND NOT WAKE UP?: NO
6. HAVE YOU EVER DONE ANYTHING, STARTED TO DO ANYTHING, OR PREPARED TO DO ANYTHING TO END YOUR LIFE?: NO
2. HAVE YOU ACTUALLY HAD ANY THOUGHTS OF KILLING YOURSELF?: NO

## 2025-07-16 ASSESSMENT — PATIENT HEALTH QUESTIONNAIRE - PHQ9
2. FEELING DOWN, DEPRESSED OR HOPELESS: NOT AT ALL
1. LITTLE INTEREST OR PLEASURE IN DOING THINGS: NOT AT ALL
SUM OF ALL RESPONSES TO PHQ9 QUESTIONS 1 AND 2: 0

## 2025-07-16 ASSESSMENT — PAIN SCALES - GENERAL: PAINLEVEL_OUTOF10: 0-NO PAIN

## 2025-07-16 NOTE — PATIENT INSTRUCTIONS
Call [phone] to schedule MRI of your inner ear. I will follow up with results.    Welcome to Tasneem Irving's clinic. We are here to assist you through your ENT care at [facility].  Tasneem is a Nurse Practitioner who specializes in General ENT. This means that she specializes in taking care of patients with usual ENT issues such as nasal congestion, allergy symptoms, sinusitis, hearing loss, ear infections, ear wax removal, hoarseness, sore throat, throat infections, reflux and some swallowing issues. She also sees patients regarding dizziness and vertigo.   Karen is Tasneem's  and she answers the office phone from 7:30am-4pm Mon-Fri. Call [phone]. She can help you with scheduling of appointments, and general questions and information. You may need to leave a message if she is helping another patient. In this case, someone from the team will call you back the same day if you leave your message before 3pm, or the next business morning.  Tasneem currently sees patients at [facility] on Mondays, Wednesdays and Thursdays.  She works closely with audiologists to solve issues with hearing. She is also in very close contact with her collaborative physicians. Dr. Lanier, a surgeon who specializes in general ENT and rhinology. She also works closely with otologist (ear surgeon) Dr. Meléndez and head and neck surgery Dr. Orellana.   Others who may be included in your care are dieticians, social workers, allergists, gastroenterologists, neurologists, and physical therapists. Tasneem will provide these referrals as needed. Please let her know if you would like to request a specific referral.  Tasneem makes every effort to run on time for your appointments. Therefore, if you are more than 15 minutes late unrelated to a scan or another appointment such as therapy or audiology, your appointment will need to be rescheduled for another day. We appreciate your understanding.   We  look forward to working with you to meet your healthcare goals.

## 2025-07-16 NOTE — PROGRESS NOTES
"AUDIOLOGY ADULT AUDIOMETRIC EVALUATION      Name:  Leah Scanlon \"Bea\"  :  1995  Age:  30 y.o.  Date of Evaluation:  2025    HISTORY  Reason for visit:  did not pass hearing screening  Ms. Scanlon is seen 2025 at the request of  Joe Zaidi DO for an evaluation of hearing.  (Seeing IMELDA Harp, FNP-C today.)    Chief complaint:    - did not pass hearing screening in May at primary care office  - diagnosed with auditory processing disorder in     Hearing loss:  left worse than right  Tinnitus:   bilateral; ringing, white noise; since chldhood; not bothersome  Otitis Media: denies; swimmer's ear in childhood  Otologic surgical history:  denies  Dizziness/imbalance:  imbalance, dizziness/vision gets a little blurry/\"wobble\" feeling  Otalgia:  bilateral, 4/10 today, intermittent since childhood  Ear pressure/fullness:  right  History of excessive noise exposure:  yes, , loud sound from speakers  Other: lupus; fibromyalgia  Family history:  unknown (patient was adopted ])***  Hearing aid history:   none         EVALUATION  Please find audiogram in \"Media\" tab (Document Type:  Audiology Report) or included at the bottom of this note.    RESULTS   Otoscopic Evaluation: ***     Immittance Measures (226 Hz probe tone):   ***    ***    Test technique:  standard behavioral technique via ***.  Reliability is ***.    Pure Tone Audiometry:  ***    Speech Audiometry:        Right Ear:  Speech Reception Threshold (SRT) was obtained at *** dBHL                 Speech discrimination score was ***% in quiet when words were presented at *** dBHL      Left Ear:  Speech Reception Threshold (SRT) was obtained at *** dBHL                 Speech discrimination score was ***% in quiet when words were presented at *** dBHL    IMPRESSIONS:  ***    RECOMMENDATIONS  ***    PATIENT EDUCATION  Discussed results and recommendations with ***.  Questions were addressed and the patient was encouraged to " contact our department should concerns arise.       SIMI Mirza, CCC-A  Licensed Audiologist    ***

## 2025-07-16 NOTE — PROGRESS NOTES
"Subjective   Patient ID: Leah Scanlon \"Jeni" is a 30 y.o. female who presents for Hearing Loss.    HPI  Patient here for hearing loss. She is accompanied by her . In 2005 she was diagnosed with auditory processing disorder. The hearing loss has been gradual over a few years. She feels the left is worse. She has bilateral tinnitus. Occasional ear pain. No ear drainage. Sometimes dizziness.     Denies previous ear surgery. Unsure family history of hearing loss.   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     I reviewed patient's past medical and surgical history.  Problem List[1]  Surgical History[2]    Review of Systems    All other systems have been reviewed and are negative for complaints except for those mentioned in history of present illness, past medical history and problem list.    Objective   Physical Exam    Constitutional: No fever, chills, weight loss or weight gain  General appearance: Appears well, well-nourished, well groomed. No acute distress.    Communication: Normal communication    Psychiatric: Oriented to person, place and time. Normal mood and affect.    Neurologic: Cranial nerves II-XII grossly intact and symmetric bilaterally.    Head and Face:  Head: Atraumatic with no masses, lesions or scarring.  Face: Normal symmetry. No scars or deformities.  TMJ: Normal, no trismus.    Eyes: Conjunctiva not edematous or erythematous.     Right Ear: External inspection of ear with no deformity, scars, or masses. EAC is clear.  TM is intact with no sign of infection, effusion, or retraction.  No perforation seen.     Left Ear: External inspection of ear with no deformity, scars, or masses. EAC is clear.  TM is intact with no sign of infection, effusion, or retraction.  No perforation seen.     Nose: External inspection of nose: No nasal lesions, lacerations or scars. Anterior rhinoscopy with limited visualization past the inferior turbinates. No tenderness on frontal or maxillary " sinus palpation.    Oral Cavity/Mouth: Oral cavity and oropharynx mucosa moist and pink. No lesions or masses. Tonsils appear surgically removed. Uvula is midline. Tongue with no masses or lesions. Tongue with good mobility. The oropharynx is clear.    Neck: Normal appearing, symmetric, trachea midline.     Cardiovascular: Examination of peripheral vascular system shows no clubbing or cyanosis.    Respiratory: No respiratory distress increased work of breathing. Inspection of the chest with symmetric chest expansion and normal respiratory effort.    Skin: No head and neck rashes.    Lymph nodes: No adenopathy.    Diagnostic Results   I personally interpreted audiogram from today.       Assessment/Plan   Diagnoses and all orders for this visit:  Asymmetric SNHL (sensorineural hearing loss)  -     MR IAC w and wo IV contrast; Future  Tinnitus of both ears    Audiogram reviewed.  We discussed the different etiologies of asymmetry including noise exposure, trauma, blood flow issues, viral inflammation, and acoustic neuroma.  I recommend MRI IAC to rule out retrocochlear pathology.  Order was placed. I will call patient with results.    She is interested in hearing aids. Has appointment with [facility]. Repeat audiogram annually.    All questions answered to patient satisfaction.          Tasneem Villatoro, IMELDA-CNP 07/16/25 11:58 AM        [1]   Patient Active Problem List  Diagnosis    Allergic contact dermatitis due to animal dander    Alternating exotropia    Arthralgia of multiple sites    Attention deficit hyperactivity disorder (ADHD)    Depression    Fibromyalgia    Esophagitis, [place] grade A    Migraine    SLE (systemic lupus erythematosus) (Multi)    Peripheral neuropathy    Urinary frequency    Tachycardia    Chest pain    Myopia of both eyes with astigmatism    Acne    Acute bronchitis    Acute infective conjunctivitis    Candidiasis of vagina    Chronic allergic conjunctivitis     Costochondritis    Dental abscess    Disease due to severe acute respiratory syndrome coronavirus 2 (SARS-CoV-2)    Dissociated vertical deviation    Dog bite of hand    Dysphagia    Fatigue    History of depression    BMI 30.0-30.9,adult   [2]   Past Surgical History:  Procedure Laterality Date    ADENOIDECTOMY  Feb 2009    EYE SURGERY  10/16/2017    Eye Surgery    FOOT SURGERY  10/16/2017    Foot Surgery    TONSILLECTOMY  10/16/2017    Tonsillectomy With Adenoidectomy

## 2025-07-21 ENCOUNTER — SPECIALTY PHARMACY (OUTPATIENT)
Dept: PHARMACY | Facility: CLINIC | Age: 30
End: 2025-07-21

## 2025-07-21 PROCEDURE — RXMED WILLOW AMBULATORY MEDICATION CHARGE

## 2025-07-28 ENCOUNTER — TELEMEDICINE CLINICAL SUPPORT (OUTPATIENT)
Dept: PHARMACY | Facility: HOSPITAL | Age: 30
End: 2025-07-28
Payer: COMMERCIAL

## 2025-07-28 DIAGNOSIS — M32.9 SYSTEMIC LUPUS ERYTHEMATOSUS, UNSPECIFIED SLE TYPE, UNSPECIFIED ORGAN INVOLVEMENT STATUS (MULTI): ICD-10-CM

## 2025-07-28 NOTE — PROGRESS NOTES
"  [facility] Clinical Note  Patient Reassessment     Introduction  Leah Scanlon \"Bea\" is a 30 y.o. female who is on the specialty pharmacy service for management of: Rheumatology Core.      [facility] supplied medication: Benlysta 200 mg under the skin once weekly        Duration of therapy: Maintenance    The most recent encounter visit with the referring prescriber Stephanie Montanez on 04/08/2025 was reviewed.  Pharmacy will continue to collaborate in the care of this patient with the referring prescriber.    Discussion  Leah was contacted on 7/28/2025 at 10:07 AM for a pharmacy visit with encounter number [id number] from:   [facility]  [address]  [facility] [address]  [place] [address]  Dept: [phone]  Dept Fax: [phone]  Loc: [phone]  Leah consented to a/an Telephone visit, which was performed.    Efficacy  Patient has developed new symptoms of condition: Yes - pt notes she has had some cardiovascular involvement and is getting MRI for ear tumors.  Patient/caregiver feels medication is affecting the disease state: States it is \"keeps it at bay\"    Goals  Provided education on goals and possible outcomes of therapy:  Adherence with therapy  Timely completion of appropriate labs  Timely and appropriate follow up with provider  Identify and address medication interactions with presciption medications, OTC medications and supplements  Optimize or maintain quality of life  Rheumatology: Remission or low disease activity  Patient has documented target(s) for goals of therapy: No        Tolerance  Patient has experienced side effects from this medication: Yes - some bruising  Changes to current therapy regimen: No    The follow-up timeline was discussed. Every person responds to and reacts to therapy differently. Patient should be assessed for efficacy and tolerability in approximately: 6 months      "       Adherence  Patient Information  Informant: Self (Patient)  Demonstrates Understanding of Importance of Adherence: Yes  Does the patient have any barriers to self-administration (including physical and mental?): Yes  Barriers to Self-Administration: difficulty with hand eye coordination.  Action Taken to Mitigate Barriers for Self-Administration: Pt's  administers injection.  Support Network for Adherence: Family Member  Medication Information  Medication: belimumab (Benlysta)  Patient Reported Missed Doses in the Last 4 Weeks: 0  Estimated Medication Adherence Level: Good  Adherence Estimation Source: Claims history  Barriers to Adherence: No Problems identified  What concerns do you have regarding your medications?: None   The importance of adherence was discussed and patient/caregiver was advised to take the medication as prescribed by their provider. Encouraged patient/caregiver to call physician's office or specialty pharmacy if they have a question regarding a missed dose.    General Assessment  Changes to home medications, OTCs or supplements: No  Current Medications[1]  Reported new allergies: No  Reported new medical conditions: No  Additional monitoring reviewed: N/A  Is laboratory follow up needed? No    Advised to contact the pharmacy if there are any changes to the patient's medication list, including prescriptions, OTC medications, herbal products, or supplements.    Impression/Plan  This patient has not been identified as high risk due to Lack of high risk qualifiers.  The following action was taken:N/A          QOL/Patient Satisfaction  Rate your quality of life on scale of 1-10: 5  Rate your satisfaction with  Specialty Pharmacy on scale of 1-10: 10 - Completely satisfied    Provided contact information ([phone]) for [facility] and reviewed dispensing process, refill timeline and patient management follow up. Confirmed understanding of education  conducted during assessment. All questions and concerns were addressed and patient/caregiver was encouraged to reach out for additional questions or concerns.    Based on the patient's diagnosis, medication list, progress towards goals, adherence, tolerance, and medication list, medication remains appropriate: Therapy remains appropriate (I attest)    William Matos, PharmD       [1]   Current Outpatient Medications   Medication Sig Dispense Refill    albuterol 90 mcg/actuation inhaler Inhale 2 puffs every 4 hours if needed for shortness of breath. 18 g 11    belimumab (Benlysta) 200 mg/mL injection Inject 200 mg (1 pen) under the skin 1 (one) time per week. 12 mL 3    buPROPion XL (Wellbutrin XL) 300 mg 24 hr tablet Take 1 tablet (300 mg) by mouth once daily. 90 tablet 1    CALCIUM ORAL Take 50 mg by mouth once daily.      cholecalciferol (Vitamin D-3) 25 MCG (1000 UT) capsule Take 1 capsule (25 mcg) by mouth once daily.      fluconazole (Diflucan) 150 mg tablet Take 1 tablet (150 mg) by mouth every 3rd day if needed (yeast infection). 2 tablet 0    fluconazole (Diflucan) 150 mg tablet Take 1 PO x1, can repeat again in 72 hours if not resolved 2 tablet 0    hydroxychloroquine (Plaquenil) 200 mg tablet Take 1 tablet (200 mg) by mouth once daily. 30 tablet 5    MAGNESIUM CARBONATE ORAL Take by mouth once daily.      multivitamin capsule Take 1 capsule by mouth once daily.      omega 3-dha-epa-fish oil (Fish OiL) 1,000 (120-180) mg capsule Take by mouth.      pilocarpine (Salagen, pilocarpine,) 5 mg tablet Take 1 tablet (5 mg) by mouth 3 times a day. 90 tablet 2    PreviDent 5000 Booster Plus 1.1 % dental paste BRUSH ON TEETH AT LEAST ONCE PER DAY FOR 2 MINUTES. SPIT OUT AFTER. DO NOT RINSE       No current facility-administered medications for this visit.

## 2025-07-29 ENCOUNTER — PHARMACY VISIT (OUTPATIENT)
Dept: PHARMACY | Facility: CLINIC | Age: 30
End: 2025-07-29
Payer: COMMERCIAL

## 2025-07-29 RX ORDER — BELIMUMAB 200 MG/ML
200 SOLUTION SUBCUTANEOUS
Qty: 4 ML | Refills: 0 | Status: SHIPPED | OUTPATIENT
Start: 2025-07-29

## 2025-07-30 ENCOUNTER — APPOINTMENT (OUTPATIENT)
Dept: RADIOLOGY | Facility: CLINIC | Age: 30
End: 2025-07-30
Payer: COMMERCIAL

## 2025-08-04 ENCOUNTER — TELEPHONE (OUTPATIENT)
Dept: GASTROENTEROLOGY | Facility: CLINIC | Age: 30
End: 2025-08-04
Payer: COMMERCIAL

## 2025-08-04 PROBLEM — B37.31 CANDIDIASIS OF VAGINA: Status: RESOLVED | Noted: 2024-02-19 | Resolved: 2025-08-04

## 2025-08-04 PROBLEM — J20.9 ACUTE BRONCHITIS: Status: RESOLVED | Noted: 2024-02-19 | Resolved: 2025-08-04

## 2025-08-04 PROBLEM — H10.30 ACUTE INFECTIVE CONJUNCTIVITIS: Status: RESOLVED | Noted: 2024-02-19 | Resolved: 2025-08-04

## 2025-08-04 PROBLEM — M89.8X7 EXOSTOSIS OF BONE OF FOOT: Status: ACTIVE | Noted: 2025-01-09

## 2025-08-04 PROBLEM — S61.459A DOG BITE OF HAND: Status: RESOLVED | Noted: 2024-02-19 | Resolved: 2025-08-04

## 2025-08-04 PROBLEM — K04.7 DENTAL ABSCESS: Status: RESOLVED | Noted: 2024-02-19 | Resolved: 2025-08-04
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
## 2025-08-04 NOTE — TELEPHONE ENCOUNTER
Called and spoke with patient.  Verified her appointment we have scheduled for tomorrow, 5 August at 10:00am with Gladis Salas CNP.  Patient stated that she had just called in and left a voicemail with someone because she left her ID somewhere and thought she had to have it for her appointment.  I assured her we could still see her.  Patient plans to attend this appointment.

## 2025-08-05 ENCOUNTER — APPOINTMENT (OUTPATIENT)
Dept: GASTROENTEROLOGY | Facility: CLINIC | Age: 30
End: 2025-08-05
Payer: COMMERCIAL

## 2025-08-05 VITALS
HEIGHT: 60 IN | OXYGEN SATURATION: 98 % | HEART RATE: 82 BPM | SYSTOLIC BLOOD PRESSURE: 110 MMHG | DIASTOLIC BLOOD PRESSURE: 77 MMHG | WEIGHT: 155 LBS | BODY MASS INDEX: 30.43 KG/M2 | RESPIRATION RATE: 16 BRPM

## 2025-08-05 DIAGNOSIS — R19.7 DIARRHEA, UNSPECIFIED TYPE: Primary | ICD-10-CM

## 2025-08-05 DIAGNOSIS — R10.30 LOWER ABDOMINAL PAIN: ICD-10-CM

## 2025-08-05 PROCEDURE — 99213 OFFICE O/P EST LOW 20 MIN: CPT | Performed by: NURSE PRACTITIONER

## 2025-08-05 PROCEDURE — 1036F TOBACCO NON-USER: CPT | Performed by: NURSE PRACTITIONER

## 2025-08-05 PROCEDURE — 3008F BODY MASS INDEX DOCD: CPT | Performed by: NURSE PRACTITIONER

## 2025-08-05 RX ORDER — DICYCLOMINE HYDROCHLORIDE 10 MG/1
10 CAPSULE ORAL 4 TIMES DAILY PRN
Qty: 60 CAPSULE | Refills: 11 | Status: SHIPPED | OUTPATIENT
Start: 2025-08-05 | End: 2026-08-05

## 2025-08-05 NOTE — PROGRESS NOTES
"Subjective   Patient ID: Leah Scanlon \"Jeni" is a 30 y.o. female who presents for Abdominal Pain (States that she is still experiencing abd pain, SOB, diarrhea after having dairy almost 2 weeks ago. She states this has happened before after having dairy but not as bad. She states this even sent her into lupus flare. She has tried Tums, Prilosec with no relief. She does take ibu 300mg daily. She is not taking any antidiarrheals. Last EGD/colon in 2020. She takes Metamucil daily and this did not help. ).  [facility] Dr. Tafoya 11/2024:   29 y.o. female with a PMH of obesity, fibromyalgia, migraines, SLE (on Plaquenil), depression, ADHD, GERD who presents to clinic for follow-up of abdominal discomfort and indigestion.  Patient is doing pretty well, occasional symptoms.  Not on any GI medications.  Of note, she is on Plaquenil.     EGD 2020: Gastritis (biopsy-proven), Bravo placed (WNL).  Normal small bowel biopsies.  Colonoscopy 2020: WNL.  Normal random biopsies.     -Start Pepcid as needed.  -Antireflux lifestyle.  -Avoid NSAIDs.  -Discussed the importance of stress reduction, good dietary habits.      [facility] follow-up 8/5/2025:  Patient with complaints of liquid diarrhea 4-5 times per day after consuming dairy 2 weeks ago.  She states that this caused a lupus flare and diarrhea.  For the past 2 weeks she has also been experiencing lower abdominal pain that is constant.  She describes this pain as cramping and rates it an 8 out of 10.  This has been affecting her sleep and breathing.  Aggravating factors include core exercises.  No alleviating factors.  No overt GI bleeding.  The first couple days she reports fever with chills along with nausea and emesis.  The symptoms have resolved.    No travel or sick contacts.  Did experience indigestion, took OTC antacids with suboptimal response.  No dysphagia.    Reports irregular heavy menses, has not followed up with GI in over a year.  No smoking or alcohol, but takes " ibuprofen daily.  Review of Systems   All other systems reviewed and are negative.      Objective   Physical Exam  Vitals reviewed.   Constitutional:       General: She is awake. She is not in acute distress.     Appearance: Normal appearance. She is well-developed and normal weight. She is not ill-appearing, toxic-appearing or diaphoretic.   HENT:      Head: Normocephalic and atraumatic.     Eyes:      General: No scleral icterus.      Cardiovascular:      Rate and Rhythm: Normal rate and regular rhythm.      Heart sounds: Normal heart sounds. No murmur heard.  Pulmonary:      Effort: Pulmonary effort is normal. No respiratory distress.      Breath sounds: Normal breath sounds.   Abdominal:      General: Abdomen is protuberant. Bowel sounds are normal.      Palpations: Abdomen is soft. There is no hepatomegaly.      Tenderness: There is abdominal tenderness in the right lower quadrant and left lower quadrant. There is no guarding or rebound.     Musculoskeletal:         General: Normal range of motion.      Cervical back: Normal range of motion.      Right lower leg: No edema.      Left lower leg: No edema.     Skin:     General: Skin is warm and dry.      Coloration: Skin is not jaundiced or pale.      Comments: Multiple scars in linear pattern on upper arms and face      Neurological:      General: No focal deficit present.      Mental Status: She is alert and oriented to person, place, and time.      Motor: No weakness.      Gait: Gait normal.     Psychiatric:         Mood and Affect: Mood normal.         Behavior: Behavior is cooperative.         Thought Content: Thought content normal.         Judgment: Judgment normal.         Assessment/Plan   Diagnoses and all orders for this visit:  Diarrhea, unspecified type  -     Calprotectin Stool; Future  -     Stool Pathogen Panel, PCR; Future  -     Ova/Para + Giardia/Cryptosporidium Antigen; Future  -     Sedimentation Rate; Future  -     C-Reactive Protein;  Future  -     Tissue Transglutaminase IgA; Future  -     IgA; Future  -     dicyclomine (Bentyl) 10 mg capsule; Take 1 capsule (10 mg) by mouth 4 times a day as needed (abdominal pain).  -     C. difficile, PCR; Future  Lower abdominal pain  -     Calprotectin Stool; Future  -     Stool Pathogen Panel, PCR; Future  -     Ova/Para + Giardia/Cryptosporidium Antigen; Future  -     Sedimentation Rate; Future  -     C-Reactive Protein; Future  -     Tissue Transglutaminase IgA; Future  -     IgA; Future  -     dicyclomine (Bentyl) 10 mg capsule; Take 1 capsule (10 mg) by mouth 4 times a day as needed (abdominal pain).  30-year-old female here with acute complaints of lower abdominal pain and diarrhea after consuming dairy 2 weeks ago.  When her symptoms first started she did experience nausea/vomiting with fever and chills.  Clinical history suggestive for viral GI versus infection/inflammation?  Recommend labs and stool studies.  She is nontoxic and in NAD.  Mild TTP to lower abdomen, no rebound pain or guarding.  Supportive care at this time.  She could also trial dicyclomine, all possible side effects reviewed with the patient.  Definitive POC pending results.    This note was created using voice recognition transcription software. Despite proofreading, unintentional typographical errors may be present. Please contact the GI office with any questions or concerns.       MARIE Orozco 08/05/25 10:07 AM

## 2025-08-07 ENCOUNTER — OFFICE VISIT (OUTPATIENT)
Dept: RHEUMATOLOGY | Facility: CLINIC | Age: 30
End: 2025-08-07
Payer: COMMERCIAL

## 2025-08-07 ENCOUNTER — APPOINTMENT (OUTPATIENT)
Dept: RHEUMATOLOGY | Facility: CLINIC | Age: 30
End: 2025-08-07
Payer: COMMERCIAL

## 2025-08-07 VITALS
SYSTOLIC BLOOD PRESSURE: 102 MMHG | OXYGEN SATURATION: 97 % | DIASTOLIC BLOOD PRESSURE: 64 MMHG | HEIGHT: 60 IN | HEART RATE: 67 BPM | BODY MASS INDEX: 30.23 KG/M2 | RESPIRATION RATE: 16 BRPM | WEIGHT: 154 LBS

## 2025-08-07 DIAGNOSIS — M32.9 SYSTEMIC LUPUS ERYTHEMATOSUS, UNSPECIFIED SLE TYPE, UNSPECIFIED ORGAN INVOLVEMENT STATUS (MULTI): Primary | ICD-10-CM

## 2025-08-07 DIAGNOSIS — K11.7 XEROSTOMIA: ICD-10-CM

## 2025-08-07 PROCEDURE — 99214 OFFICE O/P EST MOD 30 MIN: CPT | Performed by: INTERNAL MEDICINE

## 2025-08-07 PROCEDURE — 3008F BODY MASS INDEX DOCD: CPT | Performed by: INTERNAL MEDICINE

## 2025-08-07 RX ORDER — PILOCARPINE HYDROCHLORIDE 5 MG/1
5 TABLET, FILM COATED ORAL 3 TIMES DAILY
Qty: 90 TABLET | Refills: 5 | Status: SHIPPED | OUTPATIENT
Start: 2025-08-07 | End: 2026-08-07

## 2025-08-07 ASSESSMENT — PATIENT HEALTH QUESTIONNAIRE - PHQ9
3. TROUBLE FALLING OR STAYING ASLEEP OR SLEEPING TOO MUCH: MORE THAN HALF THE DAYS
6. FEELING BAD ABOUT YOURSELF - OR THAT YOU ARE A FAILURE OR HAVE LET YOURSELF OR YOUR FAMILY DOWN: SEVERAL DAYS
SUM OF ALL RESPONSES TO PHQ QUESTIONS 1-9: 14
4. FEELING TIRED OR HAVING LITTLE ENERGY: NEARLY EVERY DAY
7. TROUBLE CONCENTRATING ON THINGS, SUCH AS READING THE NEWSPAPER OR WATCHING TELEVISION: NOT AT ALL
9. THOUGHTS THAT YOU WOULD BE BETTER OFF DEAD, OR OF HURTING YOURSELF: SEVERAL DAYS
SUM OF ALL RESPONSES TO PHQ9 QUESTIONS 1 AND 2: 3
5. POOR APPETITE OR OVEREATING: MORE THAN HALF THE DAYS
1. LITTLE INTEREST OR PLEASURE IN DOING THINGS: SEVERAL DAYS
8. MOVING OR SPEAKING SO SLOWLY THAT OTHER PEOPLE COULD HAVE NOTICED. OR THE OPPOSITE, BEING SO FIGETY OR RESTLESS THAT YOU HAVE BEEN MOVING AROUND A LOT MORE THAN USUAL: MORE THAN HALF THE DAYS
2. FEELING DOWN, DEPRESSED OR HOPELESS: MORE THAN HALF THE DAYS

## 2025-08-07 ASSESSMENT — PAIN SCALES - GENERAL: PAINLEVEL_OUTOF10: 7

## 2025-08-07 NOTE — PROGRESS NOTES
Subjective   Patient ID: Bea Scanlon is a 30 y.o. female who presents for Follow-up re: SLE.    HPI  31 yo F here for f/u re: SLE. She remains on Benlysta 200 mg SC weekly and hydroxychloroquine 200 mg daily.      She lost 40 pounds over the last year.  She is walking more for exercise and is doing resistance training .  Current BMI is down to 30.    She is not having as much knee pain.  She denies malar rash.  She does complain of some hair loss.    She does have dry mouth.  Her dentist has her using PreviDent toothpaste.  Pilocarpine 5 mg 3 times daily does help.  She is also using a Waterpik and alcohol free mouthwash.    She accidentally ate some dairy products 2 weeks ago.  Following this, she started having lower abdominal pain over the last 2 weeks.  She also had some diarrhea 3-4 times per day.  She has not been on antibiotics recently.  She denies recent travel.  She was seen by Gladis Salas CNP in GI department.  Dicyclomine was recommended which has helped.  Stool for C. difficile toxin was negative.  Celiac antibodies were negative.   Stool O&P and stool for Giardia and Cryptosporidium was still pending.    She saw   May and June 2025 to undergo counseling to deal with her anxiety and depression.   psychiatrist Dr. Villa was consulted 5/07/25- suggested continue Wellbutrin, consider adding sertraline, consider long term counseling.  It appears that she was referred to outside counselors for long term counseling.    She still has mild dry eyes for which she uses Systane.  She had eye exam with Dr. Davis 4/25.  He recommended iVizia for dry eyes.     She still has Raynaud's but no digital ulcers.      She did get  January 2022.     She was previously on antidepressants since sixth grade.  She states that they never helped.     Labs 10/17: DILLON 1:320 with negative panel, rheumatoid factor negative, citrulline antibody negative, CBC normal, CMP normal, CRP negative, ESR  8.  Labs March 2025: CBC normal, BMP normal, ESR 2, double-stranded DNA negative, C3 and C4 normal, spot urine protein creatinine ratio 0.167  Labs August 2025: Spot urine protein to creatinine ratio 0.085, ESR 2, CBC normal, BMP normal, tissue transglutaminase antibody negative.     Echo 6/20: normal.     Social history: . does not smoke. Uses alcohol occasionally socially.     Medical problem list:    - SLE- DILLON 1:320 (negative panel), arthritis   - Fibromyalgia-    - History of migraine headaches          Review of Systems  General: Denies fevers or chills.  CV: Denies chest pain or palpitations.  Denies leg edema.  Lungs: Denies coughing or shortness of breath.  Skin: Denies rashes or nodules.  MS: Denies joint pain or joint swelling.     Objective   /64   Pulse 67   Resp 16   Ht (!) 1.524 m (5')   Wt 69.9 kg (154 lb)   SpO2 97%   BMI 30.08 kg/m²     Physical Exam  General appearance: Well-nourished and well-appearing.  HEENT: PERRL, EOMI  Neck: Supple, no nodes.  CV: RRR, no MGR.  Lungs: Clear, no rales or wheezes.  Abdomen: Soft, nontender. No hepatosplenomegaly.  Extremities:  No cyanosis, clubbing, or edema.  MS: No synovitis.  Skin: No rashes or nodules.       Assessment/Plan   Problem List Items Addressed This Visit           ICD-10-CM    SLE (systemic lupus erythematosus) (Multi) - Primary M32.9    BMI 30.0-30.9,adult Z68.30       1. SLE-doing better since she resumed hydroxychloroquine and Benlysta early 2023.     2. Abnormal EKG- inverted T waves in leads 3, aVF, V3 and V4. Echo 6/20 unremarkable. She had appointment  with Dr. Escobar in cardiology 11/23 due to recurrent tachycardia.  Holter monitor showed only sinus tachycardia. Echo 6/20 normal.     3. h/o frequent GERD which is occurring despite omeprazole 20 mg daily. Saw Dr. Gupta 7/20. EGD and colonoscopy were unremarkable. Bravo pH monitor was normal. Dr. Gupta did not find a correlation between acid reflux and either chest pain or  "cough. Dr. Tafoya recommended esophageal manometry 12/23.     4. BMI 30-currently working on weight loss. She lost 40 pounds over the last year.     5. Anxiety-met with  for counseling May and June 2025.  Was then referred for counseling outside the  system for more long-term counseling .    6.  Mild dry mouth-currently using pilocarpine which does help.  Also using PreviDent toothpaste.    7. Depression screen- PHQ9 score 9 score 14 (c/w moderate depression). She met with  in May and June 2025 to assist with treatment depression anxiety.   Dr. Villa in psychiatry made recommendations regarding medication May 2025.  She continues with Wellbutrin currently.  It appears that she was referred for ongoing counseling outside the  system June 2025.  I will call her back and verify this with her.  This may not meet the criteria for a clinical depression diagnosis. Symptoms were reviewed with Leah \"Bea\".  Follow-up within the next 3 months is recommended to re-assess symptoms and monitor mental health status.     Plan:  Continue same medication.  Follow-up in 4 months.                       "

## 2025-08-08 LAB
ANION GAP SERPL CALCULATED.4IONS-SCNC: 8 MMOL/L (CALC) (ref 7–17)
BASOPHILS # BLD AUTO: 28 CELLS/UL (ref 0–200)
BASOPHILS NFR BLD AUTO: 0.4 %
BUN SERPL-MCNC: 9 MG/DL (ref 7–25)
BUN/CREAT SERPL: NORMAL (CALC) (ref 6–22)
C COLI+JEJUNI+LARI FUSA STL QL NAA+PROBE: NOT DETECTED
C DIFF TOX GENS STL QL NAA+PROBE: NOT DETECTED
CALCIUM SERPL-MCNC: 9 MG/DL (ref 8.6–10.2)
CALPROTECTIN STL-MCNT: NORMAL UG/G
CHLORIDE SERPL-SCNC: 106 MMOL/L (ref 98–110)
CHOLEST SERPL-MCNC: 117 MG/DL
CHOLEST/HDLC SERPL: 1.6 (CALC)
CO2 SERPL-SCNC: 24 MMOL/L (ref 20–32)
CREAT SERPL-MCNC: 0.67 MG/DL (ref 0.5–0.97)
CREAT UR-MCNC: 47 MG/DL (ref 20–275)
CRYPTOSP AG STL QL IA: NORMAL
EC STX1 GENE STL QL NAA+PROBE: NOT DETECTED
EC STX2 GENE STL QL NAA+PROBE: NOT DETECTED
EGFRCR SERPLBLD CKD-EPI 2021: 121 ML/MIN/1.73M2
EOSINOPHIL # BLD AUTO: 98 CELLS/UL (ref 15–500)
EOSINOPHIL NFR BLD AUTO: 1.4 %
ERYTHROCYTE [DISTWIDTH] IN BLOOD BY AUTOMATED COUNT: 13 % (ref 11–15)
ERYTHROCYTE [SEDIMENTATION RATE] IN BLOOD BY WESTERGREN METHOD: 2 MM/H
G LAMBLIA AG STL QL IA: NORMAL
GLUCOSE SERPL-MCNC: 86 MG/DL (ref 65–99)
HCT VFR BLD AUTO: 44.4 % (ref 35–45)
HDLC SERPL-MCNC: 73 MG/DL
HGB BLD-MCNC: 14.2 G/DL (ref 11.7–15.5)
LDLC SERPL CALC-MCNC: 30 MG/DL (CALC)
LYMPHOCYTES # BLD AUTO: 2527 CELLS/UL (ref 850–3900)
LYMPHOCYTES NFR BLD AUTO: 36.1 %
MCH RBC QN AUTO: 26.2 PG (ref 27–33)
MCHC RBC AUTO-ENTMCNC: 32 G/DL (ref 32–36)
MCV RBC AUTO: 81.9 FL (ref 80–100)
MONOCYTES # BLD AUTO: 749 CELLS/UL (ref 200–950)
MONOCYTES NFR BLD AUTO: 10.7 %
NEUTROPHILS # BLD AUTO: 3598 CELLS/UL (ref 1500–7800)
NEUTROPHILS NFR BLD AUTO: 51.4 %
NONHDLC SERPL-MCNC: 44 MG/DL (CALC)
NOROV GI+II ORF1-ORF2 JNC STL QL NAA+PR: NOT DETECTED
O+P STL CONC: NORMAL
O+P STL TRI STN: NORMAL
PLATELET # BLD AUTO: 292 THOUSAND/UL (ref 140–400)
PMV BLD REES-ECKER: 9.2 FL (ref 7.5–12.5)
POTASSIUM SERPL-SCNC: 4.4 MMOL/L (ref 3.5–5.3)
PROT UR-MCNC: 4 MG/DL (ref 5–24)
PROT/CREAT UR: 0.09 MG/MG CREAT (ref 0.02–0.18)
PROT/CREAT UR: 85 MG/G CREAT (ref 24–184)
RBC # BLD AUTO: 5.42 MILLION/UL (ref 3.8–5.1)
RVA NSP5 STL QL NAA+PROBE: NOT DETECTED
SALMONELLA SP RPOD STL QL NAA+PROBE: NOT DETECTED
SHIGELLA DNA SPEC QL NAA+PROBE: NOT DETECTED
SODIUM SERPL-SCNC: 138 MMOL/L (ref 135–146)
TRIGL SERPL-MCNC: 55 MG/DL
V CHOL+PARA RFBL+TRKH+TNAA STL QL NAA+PR: NOT DETECTED
WBC # BLD AUTO: 7 THOUSAND/UL (ref 3.8–10.8)
Y ENTERO RECN STL QL NAA+PROBE: NOT DETECTED

## 2025-08-09 LAB
CRP SERPL-MCNC: <3 MG/L
ERYTHROCYTE [SEDIMENTATION RATE] IN BLOOD BY WESTERGREN METHOD: 2 MM/H
IGA SERPL-MCNC: 125 MG/DL (ref 47–310)
TTG IGA SER-ACNC: <1 U/ML

## 2025-08-15 LAB
C DIFF TOX GENS STL QL NAA+PROBE: NOT DETECTED
CALPROTECTIN STL-MCNT: 5 MCG/G

## 2025-08-20 ENCOUNTER — APPOINTMENT (OUTPATIENT)
Dept: RADIOLOGY | Facility: CLINIC | Age: 30
End: 2025-08-20
Payer: COMMERCIAL

## 2025-08-20 ENCOUNTER — SPECIALTY PHARMACY (OUTPATIENT)
Dept: PHARMACY | Facility: CLINIC | Age: 30
End: 2025-08-20

## 2025-08-20 DIAGNOSIS — H90.3 ASYMMETRIC SNHL (SENSORINEURAL HEARING LOSS): ICD-10-CM

## 2025-08-20 LAB
C COLI+JEJUNI+LARI FUSA STL QL NAA+PROBE: NOT DETECTED
CRYPTOSP AG STL QL IA: NORMAL
EC STX1 GENE STL QL NAA+PROBE: NOT DETECTED
EC STX2 GENE STL QL NAA+PROBE: NOT DETECTED
G LAMBLIA AG STL QL IA: NORMAL
NOROV GI+II ORF1-ORF2 JNC STL QL NAA+PR: NOT DETECTED
O+P STL TRI STN: NORMAL
RVA NSP5 STL QL NAA+PROBE: NOT DETECTED
SALMONELLA SP RPOD STL QL NAA+PROBE: NOT DETECTED
SHIGELLA DNA SPEC QL NAA+PROBE: NOT DETECTED
V CHOL+PARA RFBL+TRKH+TNAA STL QL NAA+PR: NOT DETECTED
Y ENTERO RECN STL QL NAA+PROBE: NOT DETECTED

## 2025-08-20 PROCEDURE — 70553 MRI BRAIN STEM W/O & W/DYE: CPT | Performed by: RADIOLOGY

## 2025-08-20 PROCEDURE — 2550000001 HC RX 255 CONTRASTS: Performed by: NURSE PRACTITIONER

## 2025-08-20 PROCEDURE — RXMED WILLOW AMBULATORY MEDICATION CHARGE

## 2025-08-20 PROCEDURE — 70553 MRI BRAIN STEM W/O & W/DYE: CPT

## 2025-08-20 PROCEDURE — A9575 INJ GADOTERATE MEGLUMI 0.1ML: HCPCS | Performed by: NURSE PRACTITIONER

## 2025-08-20 RX ORDER — GADOTERATE MEGLUMINE 376.9 MG/ML
0.2 INJECTION INTRAVENOUS
Status: COMPLETED | OUTPATIENT
Start: 2025-08-20 | End: 2025-08-20

## 2025-08-20 RX ADMIN — GADOTERATE MEGLUMINE 14 ML: 376.9 INJECTION INTRAVENOUS at 08:58

## 2025-08-27 ENCOUNTER — SPECIALTY PHARMACY (OUTPATIENT)
Dept: PHARMACY | Facility: CLINIC | Age: 30
End: 2025-08-27

## 2025-09-02 ENCOUNTER — PHARMACY VISIT (OUTPATIENT)
Dept: PHARMACY | Facility: CLINIC | Age: 30
End: 2025-09-02
Payer: COMMERCIAL

## 2025-09-04 ENCOUNTER — APPOINTMENT (OUTPATIENT)
Dept: PRIMARY CARE | Facility: CLINIC | Age: 30
End: 2025-09-04
Payer: COMMERCIAL

## 2025-09-04 ASSESSMENT — ENCOUNTER SYMPTOMS
LIGHT-HEADEDNESS: 0
FEVER: 0
NAUSEA: 0
DIZZINESS: 0
VOMITING: 0
SHORTNESS OF BREATH: 0

## 2025-11-10 ENCOUNTER — APPOINTMENT (OUTPATIENT)
Dept: GASTROENTEROLOGY | Facility: CLINIC | Age: 30
End: 2025-11-10
Payer: COMMERCIAL

## 2025-12-09 ENCOUNTER — APPOINTMENT (OUTPATIENT)
Dept: RHEUMATOLOGY | Facility: CLINIC | Age: 30
End: 2025-12-09
Payer: COMMERCIAL

## 2026-03-27 ENCOUNTER — APPOINTMENT (OUTPATIENT)
Dept: PRIMARY CARE | Facility: CLINIC | Age: 31
End: 2026-03-27
Payer: COMMERCIAL

## 2026-05-07 ENCOUNTER — APPOINTMENT (OUTPATIENT)
Dept: OPHTHALMOLOGY | Facility: CLINIC | Age: 31
End: 2026-05-07
Payer: COMMERCIAL